# Patient Record
Sex: MALE | Race: WHITE | NOT HISPANIC OR LATINO | Employment: OTHER | URBAN - METROPOLITAN AREA
[De-identification: names, ages, dates, MRNs, and addresses within clinical notes are randomized per-mention and may not be internally consistent; named-entity substitution may affect disease eponyms.]

---

## 2017-01-12 RX ORDER — INSULIN GLARGINE 100 [IU]/ML
INJECTION, SOLUTION SUBCUTANEOUS
Qty: 15 ML | Refills: 6 | Status: SHIPPED | OUTPATIENT
Start: 2017-01-12 | End: 2018-01-25 | Stop reason: SDUPTHER

## 2017-01-12 RX ORDER — INSULIN ASPART 100 [IU]/ML
INJECTION, SOLUTION INTRAVENOUS; SUBCUTANEOUS
Qty: 15 ML | Refills: 6 | Status: SHIPPED | OUTPATIENT
Start: 2017-01-12 | End: 2017-08-15

## 2017-02-02 RX ORDER — BLOOD SUGAR DIAGNOSTIC
STRIP MISCELLANEOUS
Qty: 150 STRIP | Refills: 0 | Status: SHIPPED | OUTPATIENT
Start: 2017-02-02 | End: 2018-01-25 | Stop reason: SDUPTHER

## 2017-02-02 RX ORDER — CALCIUM CITRATE/VITAMIN D3 200MG-6.25
TABLET ORAL
Qty: 150 STRIP | Refills: 11 | Status: SHIPPED | OUTPATIENT
Start: 2017-02-02 | End: 2018-09-17

## 2017-02-07 DIAGNOSIS — E11.9 TYPE 2 DIABETES MELLITUS WITHOUT COMPLICATION: ICD-10-CM

## 2017-02-20 RX ORDER — OMEPRAZOLE 20 MG/1
CAPSULE, DELAYED RELEASE ORAL
Qty: 30 CAPSULE | Refills: 6 | Status: SHIPPED | OUTPATIENT
Start: 2017-02-20 | End: 2017-09-06 | Stop reason: SDUPTHER

## 2017-02-20 RX ORDER — ROSUVASTATIN CALCIUM 10 MG/1
TABLET, COATED ORAL
Qty: 30 TABLET | Refills: 6 | Status: SHIPPED | OUTPATIENT
Start: 2017-02-20 | End: 2017-09-06 | Stop reason: SDUPTHER

## 2017-02-20 RX ORDER — FLUOXETINE HYDROCHLORIDE 20 MG/1
CAPSULE ORAL
Qty: 30 CAPSULE | Refills: 6 | Status: SHIPPED | OUTPATIENT
Start: 2017-02-20 | End: 2017-09-06 | Stop reason: SDUPTHER

## 2017-02-20 RX ORDER — DIVALPROEX SODIUM 500 MG/1
TABLET, FILM COATED, EXTENDED RELEASE ORAL
Qty: 30 TABLET | Refills: 6 | Status: SHIPPED | OUTPATIENT
Start: 2017-02-20 | End: 2017-09-06 | Stop reason: SDUPTHER

## 2017-02-20 RX ORDER — ENALAPRIL MALEATE 20 MG/1
TABLET ORAL
Qty: 60 TABLET | Refills: 6 | Status: SHIPPED | OUTPATIENT
Start: 2017-02-20 | End: 2017-09-06 | Stop reason: SDUPTHER

## 2017-02-20 RX ORDER — BENZTROPINE MESYLATE 0.5 MG/1
TABLET ORAL
Qty: 60 TABLET | Refills: 6 | Status: SHIPPED | OUTPATIENT
Start: 2017-02-20 | End: 2017-09-06 | Stop reason: SDUPTHER

## 2017-05-18 ENCOUNTER — TELEPHONE (OUTPATIENT)
Dept: INTERNAL MEDICINE | Facility: CLINIC | Age: 67
End: 2017-05-18

## 2017-05-18 DIAGNOSIS — E55.9 VITAMIN D DEFICIENCY DISEASE: ICD-10-CM

## 2017-05-18 DIAGNOSIS — E53.8 VITAMIN B12 DEFICIENCY: ICD-10-CM

## 2017-05-18 DIAGNOSIS — Z12.5 SCREENING PSA (PROSTATE SPECIFIC ANTIGEN): ICD-10-CM

## 2017-05-18 DIAGNOSIS — E10.42 TYPE 1 DIABETES MELLITUS WITH DIABETIC POLYNEUROPATHY: Primary | ICD-10-CM

## 2017-05-19 NOTE — TELEPHONE ENCOUNTER
Zoya  He has dementia  Contact his caregiver, Costa Ledesma  He needs an apt to see me in August with labs and urine prior  Do his optometry exam the same day as one of the apts

## 2017-05-22 ENCOUNTER — PATIENT OUTREACH (OUTPATIENT)
Dept: ADMINISTRATIVE | Facility: HOSPITAL | Age: 67
End: 2017-05-22

## 2017-05-22 DIAGNOSIS — E10.8 TYPE 1 DIABETES MELLITUS WITH COMPLICATION: Primary | ICD-10-CM

## 2017-05-22 NOTE — PROGRESS NOTES
LPN, CCC contacted patient to assist with scheduling an Annual, Diabetes Check-Up, PCP OV, with labs prior. LPN, CCC spoke with patient's care giver, Costa Ledesma. Ms. Ledesma verified patient's name and date of birth. ADORE ODONNELL notified Ms. Ledesma of patient's past due HM screenings and Annual PCP OV. Ms. Ledesma verbalized understanding, and agreed to PCP appointment on 08/15/2017, Diabetic Eye Cam Screening post PCP appointment. And lab appointment on 08/08/2017. ADORE ODONNELL thanked Ms. Ledesma for her cooperation, and advised her to contact [facility] as needed.

## 2017-05-22 NOTE — TELEPHONE ENCOUNTER
"Labs prior, PCP OV, and Eyecam appointments scheduled. Ms. Leedsma wanted me to tell you, "Things are about the same, and we will be there in August."    Thanks,  Zoya."

## 2017-06-22 RX ORDER — DIVALPROEX SODIUM 250 MG/1
TABLET, FILM COATED, EXTENDED RELEASE ORAL
Qty: 30 TABLET | Refills: 11 | Status: SHIPPED | OUTPATIENT
Start: 2017-06-22 | End: 2018-06-28 | Stop reason: SDUPTHER

## 2017-06-22 RX ORDER — BLOOD-GLUCOSE METER
EACH MISCELLANEOUS
Qty: 200 EACH | Refills: 11 | Status: SHIPPED | OUTPATIENT
Start: 2017-06-22 | End: 2018-01-25 | Stop reason: SDUPTHER

## 2017-06-22 RX ORDER — MUPIROCIN 20 MG/G
OINTMENT TOPICAL
Qty: 22 G | Refills: 11 | Status: SHIPPED | OUTPATIENT
Start: 2017-06-22 | End: 2018-04-03 | Stop reason: SDUPTHER

## 2017-07-28 DIAGNOSIS — Z12.11 COLON CANCER SCREENING: ICD-10-CM

## 2017-08-08 ENCOUNTER — LAB VISIT (OUTPATIENT)
Dept: LAB | Facility: HOSPITAL | Age: 67
End: 2017-08-08
Attending: INTERNAL MEDICINE
Payer: COMMERCIAL

## 2017-08-08 DIAGNOSIS — Z12.5 SCREENING PSA (PROSTATE SPECIFIC ANTIGEN): ICD-10-CM

## 2017-08-08 DIAGNOSIS — E10.42 TYPE 1 DIABETES MELLITUS WITH DIABETIC POLYNEUROPATHY: ICD-10-CM

## 2017-08-08 DIAGNOSIS — E53.8 VITAMIN B12 DEFICIENCY: ICD-10-CM

## 2017-08-08 DIAGNOSIS — E55.9 VITAMIN D DEFICIENCY DISEASE: ICD-10-CM

## 2017-08-08 LAB
25(OH)D3+25(OH)D2 SERPL-MCNC: 76 NG/ML
ALBUMIN SERPL BCP-MCNC: 3.4 G/DL
ALP SERPL-CCNC: 84 U/L
ALT SERPL W/O P-5'-P-CCNC: 23 U/L
ANION GAP SERPL CALC-SCNC: 10 MMOL/L
AST SERPL-CCNC: 18 U/L
BASOPHILS # BLD AUTO: 0.03 K/UL
BASOPHILS NFR BLD: 0.6 %
BILIRUB SERPL-MCNC: 0.5 MG/DL
BUN SERPL-MCNC: 17 MG/DL
CALCIUM SERPL-MCNC: 9.1 MG/DL
CHLORIDE SERPL-SCNC: 100 MMOL/L
CHOLEST/HDLC SERPL: 2.5 {RATIO}
CO2 SERPL-SCNC: 25 MMOL/L
COMPLEXED PSA SERPL-MCNC: 0.54 NG/ML
CREAT SERPL-MCNC: 0.9 MG/DL
DIFFERENTIAL METHOD: ABNORMAL
EOSINOPHIL # BLD AUTO: 0.3 K/UL
EOSINOPHIL NFR BLD: 6.1 %
ERYTHROCYTE [DISTWIDTH] IN BLOOD BY AUTOMATED COUNT: 13.3 %
EST. GFR  (AFRICAN AMERICAN): >60 ML/MIN/1.73 M^2
EST. GFR  (NON AFRICAN AMERICAN): >60 ML/MIN/1.73 M^2
GLUCOSE SERPL-MCNC: 352 MG/DL
HCT VFR BLD AUTO: 38.3 %
HDL/CHOLESTEROL RATIO: 39.7 %
HDLC SERPL-MCNC: 141 MG/DL
HDLC SERPL-MCNC: 56 MG/DL
HGB BLD-MCNC: 13.1 G/DL
LDLC SERPL CALC-MCNC: 65.4 MG/DL
LYMPHOCYTES # BLD AUTO: 2.1 K/UL
LYMPHOCYTES NFR BLD: 40 %
MCH RBC QN AUTO: 30 PG
MCHC RBC AUTO-ENTMCNC: 34.2 G/DL
MCV RBC AUTO: 88 FL
MONOCYTES # BLD AUTO: 0.6 K/UL
MONOCYTES NFR BLD: 10.5 %
NEUTROPHILS # BLD AUTO: 2.2 K/UL
NEUTROPHILS NFR BLD: 42.6 %
NONHDLC SERPL-MCNC: 85 MG/DL
PLATELET # BLD AUTO: 250 K/UL
PMV BLD AUTO: 11.9 FL
POTASSIUM SERPL-SCNC: 4.9 MMOL/L
PROT SERPL-MCNC: 7 G/DL
RBC # BLD AUTO: 4.36 M/UL
SODIUM SERPL-SCNC: 135 MMOL/L
TRIGL SERPL-MCNC: 98 MG/DL
TSH SERPL DL<=0.005 MIU/L-ACNC: 2.13 UIU/ML
VIT B12 SERPL-MCNC: >2000 PG/ML
WBC # BLD AUTO: 5.22 K/UL

## 2017-08-08 PROCEDURE — 80061 LIPID PANEL: CPT

## 2017-08-08 PROCEDURE — 84443 ASSAY THYROID STIM HORMONE: CPT

## 2017-08-08 PROCEDURE — 84153 ASSAY OF PSA TOTAL: CPT

## 2017-08-08 PROCEDURE — 83036 HEMOGLOBIN GLYCOSYLATED A1C: CPT

## 2017-08-08 PROCEDURE — 36415 COLL VENOUS BLD VENIPUNCTURE: CPT | Mod: PO

## 2017-08-08 PROCEDURE — 85025 COMPLETE CBC W/AUTO DIFF WBC: CPT

## 2017-08-08 PROCEDURE — 82607 VITAMIN B-12: CPT

## 2017-08-08 PROCEDURE — 82306 VITAMIN D 25 HYDROXY: CPT

## 2017-08-08 PROCEDURE — 80053 COMPREHEN METABOLIC PANEL: CPT

## 2017-08-09 LAB
ESTIMATED AVG GLUCOSE: 223 MG/DL
HBA1C MFR BLD HPLC: 9.4 %

## 2017-08-11 ENCOUNTER — TELEPHONE (OUTPATIENT)
Dept: OPTOMETRY | Facility: CLINIC | Age: 67
End: 2017-08-11

## 2017-08-15 ENCOUNTER — OFFICE VISIT (OUTPATIENT)
Dept: INTERNAL MEDICINE | Facility: CLINIC | Age: 67
End: 2017-08-15
Payer: COMMERCIAL

## 2017-08-15 ENCOUNTER — TELEPHONE (OUTPATIENT)
Dept: INTERNAL MEDICINE | Facility: CLINIC | Age: 67
End: 2017-08-15

## 2017-08-15 VITALS
HEART RATE: 60 BPM | HEIGHT: 72 IN | WEIGHT: 174 LBS | SYSTOLIC BLOOD PRESSURE: 110 MMHG | DIASTOLIC BLOOD PRESSURE: 60 MMHG | OXYGEN SATURATION: 98 % | BODY MASS INDEX: 23.57 KG/M2

## 2017-08-15 DIAGNOSIS — Z00.00 ROUTINE GENERAL MEDICAL EXAMINATION AT A HEALTH CARE FACILITY: Primary | ICD-10-CM

## 2017-08-15 PROCEDURE — 99999 PR PBB SHADOW E&M-EST. PATIENT-LVL II: CPT | Mod: PBBFAC,,, | Performed by: INTERNAL MEDICINE

## 2017-08-15 PROCEDURE — 99396 PREV VISIT EST AGE 40-64: CPT | Mod: S$GLB,,, | Performed by: INTERNAL MEDICINE

## 2017-08-15 NOTE — TELEPHONE ENCOUNTER
----- Message from Jayson Martines sent at 8/15/2017 11:12 AM CDT -----  Contact: Costa Friend [phone]  Is calling ahead of the above pt's apt with you today, don't want to say this in front of him, But his Memory is getting shorter and shorter, a little Paranoid, more anger, Delusional memory loss, hopefully you can prescribe a medication to help him, advice    Thanks

## 2017-08-15 NOTE — PROGRESS NOTES
CHIEF COMPLAINT: Annual exam    HISTORY OF PRESENT ILLNESS: 66-year-old man presents for an annual exam. He has been doing well. He lives with his Costa Ledesma ([phone]), who helps him with his medications and meals. Most of the history is obtained from Costa due to his dementia. He is happy living with Costa. He is eating well. He is currently taking Lantus 15 units in the evening and Novolog 3-5 units plus sliding scale before meals. Blood sugars have been running 100-300. No fever, chills, nausea, voimiting, constipation, diarrhea. No significant hypoglycemia     No dysuria, hematuria, nocturia, straining or incomplete void, chest pain, shortness of breath, rhinorrhea, headache, blurred vision, hearing loss. He continues to take Crestor 10 mg daily for his hyperlipidemia. He denies any joint pain, muscle pain. He continues to take enalapril 20 mg twice daily and Norvasc 10 mg daily for his hypertension. He is on omeprazole 20 mg once daily for reflux and has not had any reflux symptoms.     HE is currently taking prozac 20 mg daily, depakote 250 mg in am and 500 mg in the evening and cogentin 0.5 mg twice daily for his mood and seizure disorder. He has not had any seizures. Mood is good. HE gets irritable at times and is redirectable.  His memory has generally declined.  He is not paranoid.  He sleeps well.  No depression.      He has seen neurology for his movement disorder. They feel that he could have Tardative Dyskinesia. He was taking amatandine twice daily and mirapex. His movement improved but he got very angry so Costa stopped the medication. His excessive movement does not bother him.      HE is sleeping well with Melatonin 10 mg at bedtime and occasional benadryl one tablet at bedtime.     He quit smoking over 4 years ago - cough is improved. NO gagging or difficulty eating. He has not been ill in the last year.     PAST MEDICAL HISTORY:   1. Vascular dementia.   2. Type I diabetes.   3. Seizure  disorder.   4. History of polysubstance abuse.   5. Recovered alcoholic   6. Bipolar disorder.   7. History of renal insufficiency.   8. Hepatitis C.   9. History of left a left upper extremity DVT January 2007 after an IV.   10. First cervical spine arthritis on C6-C7 on x-ray.   11. Hospitalization July 2008 due to ingestion of caustic substance, which caused erosive esophagitis.    12. History of her aspiration during hospitalization 7/2008.     MEDICATIONS and ALLERGIES: Updated on epic.     PHYSICAL EXAMINATION:   /60   Pulse 60   Ht 6' (1.829 m)   Wt 78.9 kg (174 lb)   SpO2 98%   BMI 23.60 kg/m²     General: Alert, oriented. No apparent distress. Affect within normal limits.   Conjunctivae anicteric. Tympanic membranes clear. Oropharynx clear. Poor dentition  Neck supple.   Respiratory effort normal. Lungs clear bilaterally. Good air flow  Heart: Regular rate and rhythm without murmurs, gallops or rubs.   No lower extremity edema.   Abdomen: Soft, nondistended, nontender. Bowel sounds present. No   hepatosplenomegaly.    Constant movement of arms, legs, lips and tongue. He could not stay still. He did not look anxious.    Protective Sensation (w/ 10 gram monofilament):  Right: Decreased  Left: Decreased    Visual Inspection:  Normal -  Bilateral    Pedal Pulses:   Right: Present  Left: Present    Posterior tibialis:   Right:Present  Left: Present        ASSESSMENT AND PLAN:    Annual exam  1. Type I diabetes - stable.  2. Hypertension - controlled.   3. Hyperlipidemia - controlled  4. Hepatitis C - stable  5. Bipolar disorder - mood stable    7. COPD - has quit  9. Screening - . Pt declines colonscopy. PSA 8/17  10. Movement disorder - stable. Declines neurology exam  I'll see him back in 3 months, sooner if problems arise  Caregiver does not want him to come in 3 months but 6 months  Pneumovax and flu 2015

## 2017-09-06 RX ORDER — ENALAPRIL MALEATE 20 MG/1
TABLET ORAL
Qty: 60 TABLET | Refills: 11 | Status: SHIPPED | OUTPATIENT
Start: 2017-09-06 | End: 2018-09-17

## 2017-09-06 RX ORDER — FLUOXETINE HYDROCHLORIDE 20 MG/1
CAPSULE ORAL
Qty: 30 CAPSULE | Refills: 11 | Status: SHIPPED | OUTPATIENT
Start: 2017-09-06 | End: 2018-09-17

## 2017-09-06 RX ORDER — ROSUVASTATIN CALCIUM 10 MG/1
TABLET, COATED ORAL
Qty: 30 TABLET | Refills: 11 | Status: SHIPPED | OUTPATIENT
Start: 2017-09-06 | End: 2018-09-17

## 2017-09-06 RX ORDER — BENZTROPINE MESYLATE 0.5 MG/1
TABLET ORAL
Qty: 60 TABLET | Refills: 11 | Status: SHIPPED | OUTPATIENT
Start: 2017-09-06 | End: 2018-09-17

## 2017-09-06 RX ORDER — DIVALPROEX SODIUM 500 MG/1
TABLET, FILM COATED, EXTENDED RELEASE ORAL
Qty: 30 TABLET | Refills: 11 | Status: SHIPPED | OUTPATIENT
Start: 2017-09-06 | End: 2018-10-01 | Stop reason: SDUPTHER

## 2017-09-06 RX ORDER — OMEPRAZOLE 20 MG/1
CAPSULE, DELAYED RELEASE ORAL
Qty: 30 CAPSULE | Refills: 11 | Status: SHIPPED | OUTPATIENT
Start: 2017-09-06 | End: 2018-09-17

## 2017-12-04 ENCOUNTER — TELEPHONE (OUTPATIENT)
Dept: INTERNAL MEDICINE | Facility: CLINIC | Age: 67
End: 2017-12-04

## 2017-12-04 NOTE — TELEPHONE ENCOUNTER
----- Message from Tere Roland sent at 12/4/2017  1:45 PM CST -----  Contact: Costa/claudine/[phone]  Requesting test result be mailed to [address] [place] [address]. Please advise.

## 2017-12-05 NOTE — TELEPHONE ENCOUNTER
----- Message from Melissa Moore sent at 12/4/2017  4:31 PM CST -----  Contact: pt [phone]  Pt is returning call back regarding test results

## 2018-01-02 RX ORDER — AMLODIPINE BESYLATE 10 MG/1
TABLET ORAL
Qty: 90 TABLET | Refills: 5 | Status: SHIPPED | OUTPATIENT
Start: 2018-01-02 | End: 2018-09-17

## 2018-01-25 RX ORDER — INSULIN ASPART 100 [IU]/ML
INJECTION, SOLUTION INTRAVENOUS; SUBCUTANEOUS
Qty: 15 ML | Refills: 6 | Status: SHIPPED | OUTPATIENT
Start: 2018-01-25 | End: 2018-09-17 | Stop reason: SDUPTHER

## 2018-01-25 RX ORDER — BLOOD SUGAR DIAGNOSTIC
STRIP MISCELLANEOUS
Qty: 150 STRIP | Refills: 11 | Status: SHIPPED | OUTPATIENT
Start: 2018-01-25 | End: 2018-09-17

## 2018-01-25 RX ORDER — INSULIN GLARGINE 100 [IU]/ML
INJECTION, SOLUTION SUBCUTANEOUS
Qty: 15 ML | Refills: 6 | Status: SHIPPED | OUTPATIENT
Start: 2018-01-25 | End: 2018-09-17

## 2018-01-25 NOTE — TELEPHONE ENCOUNTER
Pt is running out of needles by the end of the month. Costa would like extra insulin and needles quantity called in. Costa would also like to have a Rx of tamiflu on hand just in case she thinks he needs it. Pt has no symptoms

## 2018-01-25 NOTE — TELEPHONE ENCOUNTER
----- Message from Lucio Sauceda sent at 1/25/2018  9:17 AM CST -----  Contact: Friend/Costa [phone]  Although, the patient doesn't have any flu symptoms she would like to know if you will call in a prescription for Tamiflu.    Pharmacy: [facility]  [place] - [phone] (phone) [phone] (Fax)    Thank you    Refill requests:    Medical Advice also: She requested that you change his insulin glargine (LANTUS SOLOSTAR) 100 unit/mL (3 mL) InPn pen and the LANTUS SOLOSTAR 100 unit/mL (3 mL) InPn pen because she has been having to give him more than prescribed whenever his sugar is higher.    She also would like you to order 2 boxes of ACCU-CHEK SAFE-T-PRO 23 gauge Misc instead of one.        Thank you

## 2018-03-06 DIAGNOSIS — E11.9 TYPE 2 DIABETES MELLITUS WITHOUT COMPLICATION: ICD-10-CM

## 2018-03-28 ENCOUNTER — TELEPHONE (OUTPATIENT)
Dept: INTERNAL MEDICINE | Facility: CLINIC | Age: 68
End: 2018-03-28

## 2018-03-28 DIAGNOSIS — F03.90 DEMENTIA WITHOUT BEHAVIORAL DISTURBANCE, UNSPECIFIED DEMENTIA TYPE: Primary | ICD-10-CM

## 2018-03-28 NOTE — TELEPHONE ENCOUNTER
----- Message from Vicky España sent at 3/28/2018 10:48 AM CDT -----  Contact: Friend/Costa [phone]  Requesting a Referral    Requesting to see: Neurologist    Reason for request: Brain Scan/Mood Swings/Memory loss    Specific physician requested: No    Please call patient to schedule appt when referral has been placed.    Thank You

## 2018-04-03 RX ORDER — MUPIROCIN 20 MG/G
OINTMENT TOPICAL
Qty: 22 G | Refills: 4 | Status: SHIPPED | OUTPATIENT
Start: 2018-04-03 | End: 2018-09-17

## 2018-04-09 ENCOUNTER — TELEPHONE (OUTPATIENT)
Dept: NEUROLOGY | Facility: CLINIC | Age: 68
End: 2018-04-09

## 2018-04-09 NOTE — TELEPHONE ENCOUNTER
----- Message from Tamera Keys sent at 4/9/2018  9:21 AM CDT -----  Contact: Costa Ledesma- pt roommate  Pt's roommate Costa Ledesma called to schedule an appt for pt due to dementia. Pt does have a referral in the system from Dr. Gemma Boone. Pt roommate would like for the nurse to give her a call back to get pt scheduled some time at the end of May, or in June, on a Monday preferably, if at all possible (appt books not showing any availability) Please advise.    Pt roommate can be reached at [phone]

## 2018-05-21 ENCOUNTER — OFFICE VISIT (OUTPATIENT)
Dept: NEUROLOGY | Facility: CLINIC | Age: 68
End: 2018-05-21
Payer: COMMERCIAL

## 2018-05-21 VITALS — WEIGHT: 152 LBS | HEIGHT: 72 IN | BODY MASS INDEX: 20.59 KG/M2

## 2018-05-21 DIAGNOSIS — G25.5 CHOREA: ICD-10-CM

## 2018-05-21 DIAGNOSIS — R13.10 DYSPHAGIA, UNSPECIFIED TYPE: Primary | ICD-10-CM

## 2018-05-21 PROCEDURE — 3008F BODY MASS INDEX DOCD: CPT | Mod: CPTII,S$GLB,, | Performed by: NURSE PRACTITIONER

## 2018-05-21 PROCEDURE — 99999 PR PBB SHADOW E&M-EST. PATIENT-LVL III: CPT | Mod: PBBFAC,,, | Performed by: NURSE PRACTITIONER

## 2018-05-21 PROCEDURE — 99204 OFFICE O/P NEW MOD 45 MIN: CPT | Mod: S$GLB,,, | Performed by: NURSE PRACTITIONER

## 2018-05-21 RX ORDER — OLANZAPINE 2.5 MG/1
2.5 TABLET ORAL 2 TIMES DAILY
Qty: 60 TABLET | Refills: 11 | Status: SHIPPED | OUTPATIENT
Start: 2018-05-21 | End: 2018-09-17

## 2018-05-21 NOTE — PATIENT INSTRUCTIONS
I have ordered swallow study. This will ensure you are swallowing safely.    I have also ordered olanzapine 2.5 mg twice daily. Please give in the morning and evening.     Does anyone in the family have movements like you?   Has anyone in the family been diagnosed with [place]'s disease?
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
## 2018-06-11 ENCOUNTER — TELEPHONE (OUTPATIENT)
Dept: NEUROLOGY | Facility: CLINIC | Age: 68
End: 2018-06-11

## 2018-06-11 NOTE — TELEPHONE ENCOUNTER
----- Message from Ale Adams NP sent at 5/21/2018  6:31 PM CDT -----  Needs appt with HD at risk clinic

## 2018-06-11 NOTE — TELEPHONE ENCOUNTER
Returned call to pt's wife and she states the medication did not work and she stopped it as patient was seeing things. She also states she talk to his family and no one knows about this disease and she knows the patient does not have this disease and does not want to scheduled any appointments. She states if we need further information about the patient to contact PCP.

## 2018-06-19 ENCOUNTER — TELEPHONE (OUTPATIENT)
Dept: INTERNAL MEDICINE | Facility: CLINIC | Age: 68
End: 2018-06-19

## 2018-06-19 DIAGNOSIS — E55.9 VITAMIN D DEFICIENCY DISEASE: ICD-10-CM

## 2018-06-19 DIAGNOSIS — E53.8 VITAMIN B12 DEFICIENCY: ICD-10-CM

## 2018-06-19 DIAGNOSIS — E10.9 TYPE 1 DIABETES MELLITUS WITHOUT COMPLICATION: Primary | ICD-10-CM

## 2018-06-19 NOTE — TELEPHONE ENCOUNTER
----- Message from Ruth Magallon sent at 6/18/2018 11:08 AM CDT -----  Contact: room mate/jake/[phone]      ----- Message -----  From: Gemma Dugan  Sent: 6/18/2018  10:30 AM  To: Paolo ZAPATA Staff    Pt jackelin pichardo called in regards to talking to the dr about giving the pt holy basel (gaya) vitamins and  ashwagandha vitamins and it is calming the pt down. The med's come from [facility]. She would like to talk to the dr about the pt. The pharmacy will sent the office about the vitamins. She would also like to get the pt epp lab orders put into the system. She would like his appointment on 9-10-18 10:[facility].      Please advise

## 2018-06-19 NOTE — TELEPHONE ENCOUNTER
Spoke with Costa, she states that the vitamins are really helping him and just wanted to notify the PCP. Requesting physical lab orders. Would like labs schedule 9/10/18 at 10 [facility].      Please see message below.

## 2018-06-25 ENCOUNTER — TELEPHONE (OUTPATIENT)
Dept: INTERNAL MEDICINE | Facility: CLINIC | Age: 68
End: 2018-06-25

## 2018-06-25 NOTE — TELEPHONE ENCOUNTER
----- Message from Lucio Sauceda sent at 6/25/2018 11:17 AM CDT -----  Contact: Roommate/Costa [phone]  Patient is returning a phone call.  Who left a message for the patient: Dr. Boone  Does patient know what this is regarding:  She wants to know if he should be taking the Holy Rosemary Vitamin that she has been giving him. She said the pharmacist said it's ok, and it is working to help him be calm.    She also said she doesn't know what the fit kit is.    Thank you

## 2018-06-28 RX ORDER — BLOOD-GLUCOSE METER
EACH MISCELLANEOUS
Qty: 500 EACH | Refills: 4 | Status: SHIPPED | OUTPATIENT
Start: 2018-06-28 | End: 2018-09-17

## 2018-06-28 RX ORDER — DIVALPROEX SODIUM 250 MG/1
TABLET, FILM COATED, EXTENDED RELEASE ORAL
Qty: 30 TABLET | Refills: 11 | Status: SHIPPED | OUTPATIENT
Start: 2018-06-28 | End: 2018-09-17

## 2018-08-03 DIAGNOSIS — Z12.11 COLON CANCER SCREENING: ICD-10-CM

## 2018-09-10 ENCOUNTER — LAB VISIT (OUTPATIENT)
Dept: LAB | Facility: HOSPITAL | Age: 68
End: 2018-09-10
Attending: INTERNAL MEDICINE
Payer: COMMERCIAL

## 2018-09-10 DIAGNOSIS — E10.9 TYPE 1 DIABETES MELLITUS WITHOUT COMPLICATION: ICD-10-CM

## 2018-09-10 DIAGNOSIS — E55.9 VITAMIN D DEFICIENCY DISEASE: ICD-10-CM

## 2018-09-10 DIAGNOSIS — E53.8 VITAMIN B12 DEFICIENCY: ICD-10-CM

## 2018-09-10 LAB
25(OH)D3+25(OH)D2 SERPL-MCNC: 48 NG/ML
ALBUMIN SERPL BCP-MCNC: 3.6 G/DL
ALP SERPL-CCNC: 99 U/L
ALT SERPL W/O P-5'-P-CCNC: 25 U/L
ANION GAP SERPL CALC-SCNC: 9 MMOL/L
AST SERPL-CCNC: 18 U/L
BASOPHILS # BLD AUTO: 0.02 K/UL
BASOPHILS NFR BLD: 0.4 %
BILIRUB SERPL-MCNC: 0.4 MG/DL
BUN SERPL-MCNC: 17 MG/DL
CALCIUM SERPL-MCNC: 9.2 MG/DL
CHLORIDE SERPL-SCNC: 101 MMOL/L
CHOLEST SERPL-MCNC: 158 MG/DL
CHOLEST/HDLC SERPL: 2.6 {RATIO}
CO2 SERPL-SCNC: 28 MMOL/L
CREAT SERPL-MCNC: 0.8 MG/DL
DIFFERENTIAL METHOD: ABNORMAL
EOSINOPHIL # BLD AUTO: 0.3 K/UL
EOSINOPHIL NFR BLD: 5 %
ERYTHROCYTE [DISTWIDTH] IN BLOOD BY AUTOMATED COUNT: 13.4 %
EST. GFR  (AFRICAN AMERICAN): >60 ML/MIN/1.73 M^2
EST. GFR  (NON AFRICAN AMERICAN): >60 ML/MIN/1.73 M^2
ESTIMATED AVG GLUCOSE: 240 MG/DL
GLUCOSE SERPL-MCNC: 431 MG/DL
HBA1C MFR BLD HPLC: 10 %
HCT VFR BLD AUTO: 38 %
HDLC SERPL-MCNC: 61 MG/DL
HDLC SERPL: 38.6 %
HGB BLD-MCNC: 12.5 G/DL
IMM GRANULOCYTES # BLD AUTO: 0.02 K/UL
IMM GRANULOCYTES NFR BLD AUTO: 0.4 %
LDLC SERPL CALC-MCNC: 84 MG/DL
LYMPHOCYTES # BLD AUTO: 1.7 K/UL
LYMPHOCYTES NFR BLD: 30.7 %
MCH RBC QN AUTO: 30.1 PG
MCHC RBC AUTO-ENTMCNC: 32.9 G/DL
MCV RBC AUTO: 92 FL
MONOCYTES # BLD AUTO: 0.6 K/UL
MONOCYTES NFR BLD: 9.9 %
NEUTROPHILS # BLD AUTO: 3 K/UL
NEUTROPHILS NFR BLD: 53.6 %
NONHDLC SERPL-MCNC: 97 MG/DL
NRBC BLD-RTO: 0 /100 WBC
PLATELET # BLD AUTO: 201 K/UL
PMV BLD AUTO: 12.2 FL
POTASSIUM SERPL-SCNC: 4.6 MMOL/L
PROT SERPL-MCNC: 7.1 G/DL
RBC # BLD AUTO: 4.15 M/UL
SODIUM SERPL-SCNC: 138 MMOL/L
TRIGL SERPL-MCNC: 65 MG/DL
TSH SERPL DL<=0.005 MIU/L-ACNC: 2.4 UIU/ML
VIT B12 SERPL-MCNC: 1786 PG/ML
WBC # BLD AUTO: 5.57 K/UL

## 2018-09-10 PROCEDURE — 82306 VITAMIN D 25 HYDROXY: CPT

## 2018-09-10 PROCEDURE — 82607 VITAMIN B-12: CPT

## 2018-09-10 PROCEDURE — 80061 LIPID PANEL: CPT

## 2018-09-10 PROCEDURE — 83036 HEMOGLOBIN GLYCOSYLATED A1C: CPT

## 2018-09-10 PROCEDURE — 85025 COMPLETE CBC W/AUTO DIFF WBC: CPT

## 2018-09-10 PROCEDURE — 80053 COMPREHEN METABOLIC PANEL: CPT

## 2018-09-10 PROCEDURE — 36415 COLL VENOUS BLD VENIPUNCTURE: CPT | Mod: PO

## 2018-09-10 PROCEDURE — 84443 ASSAY THYROID STIM HORMONE: CPT

## 2018-09-17 ENCOUNTER — OFFICE VISIT (OUTPATIENT)
Dept: INTERNAL MEDICINE | Facility: CLINIC | Age: 68
End: 2018-09-17
Payer: COMMERCIAL

## 2018-09-17 VITALS
BODY MASS INDEX: 22.31 KG/M2 | SYSTOLIC BLOOD PRESSURE: 117 MMHG | OXYGEN SATURATION: 97 % | DIASTOLIC BLOOD PRESSURE: 70 MMHG | WEIGHT: 173.88 LBS | HEIGHT: 74 IN | HEART RATE: 102 BPM

## 2018-09-17 DIAGNOSIS — E10.8 TYPE 1 DIABETES MELLITUS WITH COMPLICATION: ICD-10-CM

## 2018-09-17 DIAGNOSIS — Z00.00 ROUTINE GENERAL MEDICAL EXAMINATION AT A HEALTH CARE FACILITY: Primary | ICD-10-CM

## 2018-09-17 DIAGNOSIS — B19.20 HEPATITIS C VIRUS INFECTION WITHOUT HEPATIC COMA, UNSPECIFIED CHRONICITY: ICD-10-CM

## 2018-09-17 PROCEDURE — 99397 PER PM REEVAL EST PAT 65+ YR: CPT | Mod: S$GLB,,, | Performed by: INTERNAL MEDICINE

## 2018-09-17 PROCEDURE — 3078F DIAST BP <80 MM HG: CPT | Mod: CPTII,S$GLB,, | Performed by: INTERNAL MEDICINE

## 2018-09-17 PROCEDURE — 3046F HEMOGLOBIN A1C LEVEL >9.0%: CPT | Mod: CPTII,S$GLB,, | Performed by: INTERNAL MEDICINE

## 2018-09-17 PROCEDURE — 99999 PR PBB SHADOW E&M-EST. PATIENT-LVL III: CPT | Mod: PBBFAC,,, | Performed by: INTERNAL MEDICINE

## 2018-09-17 PROCEDURE — 3074F SYST BP LT 130 MM HG: CPT | Mod: CPTII,S$GLB,, | Performed by: INTERNAL MEDICINE

## 2018-09-17 RX ORDER — INSULIN GLARGINE 100 [IU]/ML
20 INJECTION, SOLUTION SUBCUTANEOUS NIGHTLY
Qty: 15 ML | Refills: 6 | Status: SHIPPED | OUTPATIENT
Start: 2018-09-17 | End: 2019-10-21 | Stop reason: SDUPTHER

## 2018-09-17 RX ORDER — ENALAPRIL MALEATE 20 MG/1
20 TABLET ORAL 2 TIMES DAILY
Qty: 60 TABLET | Refills: 11 | Status: SHIPPED | OUTPATIENT
Start: 2018-09-17 | End: 2019-09-25 | Stop reason: SDUPTHER

## 2018-09-17 RX ORDER — INSULIN ASPART 100 [IU]/ML
INJECTION, SOLUTION INTRAVENOUS; SUBCUTANEOUS
Qty: 15 ML | Refills: 6 | Status: SHIPPED | OUTPATIENT
Start: 2018-09-17 | End: 2019-03-14 | Stop reason: SDUPTHER

## 2018-09-17 RX ORDER — ROSUVASTATIN CALCIUM 10 MG/1
10 TABLET, COATED ORAL NIGHTLY
Qty: 30 TABLET | Refills: 11 | Status: SHIPPED | OUTPATIENT
Start: 2018-09-17 | End: 2019-10-21 | Stop reason: SDUPTHER

## 2018-09-17 RX ORDER — AMLODIPINE BESYLATE 10 MG/1
10 TABLET ORAL DAILY
Qty: 90 TABLET | Refills: 5 | Status: SHIPPED | OUTPATIENT
Start: 2018-09-17 | End: 2019-09-23 | Stop reason: SDUPTHER

## 2018-09-17 RX ORDER — KETOCONAZOLE 20 MG/ML
SHAMPOO, SUSPENSION TOPICAL
Qty: 240 ML | Refills: 3 | Status: SHIPPED | OUTPATIENT
Start: 2018-09-17 | End: 2019-10-21 | Stop reason: SDUPTHER

## 2018-09-17 RX ORDER — MUPIROCIN 20 MG/G
OINTMENT TOPICAL
Qty: 90 G | Refills: 3 | Status: SHIPPED | OUTPATIENT
Start: 2018-09-17 | End: 2019-08-09 | Stop reason: SDUPTHER

## 2018-09-17 RX ORDER — FLUOXETINE HYDROCHLORIDE 20 MG/1
20 CAPSULE ORAL DAILY
Qty: 30 CAPSULE | Refills: 11 | Status: SHIPPED | OUTPATIENT
Start: 2018-09-17 | End: 2019-09-23 | Stop reason: SDUPTHER

## 2018-09-17 RX ORDER — BENZTROPINE MESYLATE 0.5 MG/1
0.5 TABLET ORAL 2 TIMES DAILY
Qty: 60 TABLET | Refills: 11 | Status: SHIPPED | OUTPATIENT
Start: 2018-09-17 | End: 2019-10-21 | Stop reason: SDUPTHER

## 2018-09-17 RX ORDER — OMEPRAZOLE 20 MG/1
20 CAPSULE, DELAYED RELEASE ORAL DAILY
Qty: 30 CAPSULE | Refills: 11 | Status: SHIPPED | OUTPATIENT
Start: 2018-09-17 | End: 2019-09-23 | Stop reason: SDUPTHER

## 2018-09-17 RX ORDER — DIVALPROEX SODIUM 250 MG/1
250 TABLET, FILM COATED, EXTENDED RELEASE ORAL DAILY
Qty: 30 TABLET | Refills: 11 | Status: SHIPPED | OUTPATIENT
Start: 2018-09-17 | End: 2019-09-23 | Stop reason: SDUPTHER

## 2018-09-17 NOTE — PROGRESS NOTES
CHIEF COMPLAINT: Annual exam    HISTORY OF PRESENT ILLNESS: 66-year-old man presents for an annual exam.     He has been doing well. He lives with his Costa Ledesma ([phone]), who helps him with his medications and meals. Most of the history is obtained from Costa due to his dementia. He is happy living with Costa. He is eating well.     HE is now taking ashwagandha (orgainic ashwagandha 380 mg plus ashwagandha extract 95 mg) one capsule twice daily and Holy Basil Leaf (holy basil leaf extract 292 mg and holy basil leaf supercritical extract 190 mg) one capsule twice daily.  HE is also taking Melatonin 10 mg three times daily which helps calm him. He is sleeping well. He has less movement of arms, legs, tongue.  HE can sit still in a chair.  Memory is better. HE is more aware.  Mood is better. HE is calmer.  Disposition is better. He rarely gets irritable. Anxiety is better. Swallowing is better. He did not have a swallowing better.  NO chocking.  He no longer worries about things.  He can hold a conversation. HE now has a sense of humor.  He saw neurology 5/21/18 who recommened Zyprexa 2.5 mg twice daily which made things worse. HE was hearing voices so Costa stopped this medication.     He is currently taking Lantus 15 units in the evening and Novolog 3-5 units plus sliding scale before meals. Blood sugars have been higher.  Costa gives him extra Lanuts twice a week 8-10 units extra.. No fever, chills, nausea, voimiting, constipation, diarrhea. No significant hypoglycemia     No dysuria, hematuria, nocturia, straining or incomplete void, chest pain, shortness of breath, rhinorrhea, headache, blurred vision, hearing loss. He continues to take Crestor 10 mg daily for his hyperlipidemia. He denies any joint pain, muscle pain. He continues to take enalapril 20 mg twice daily and Norvasc 10 mg daily for his hypertension. He is on omeprazole 20 mg once daily for reflux and has not had any reflux symptoms.     HE is  currently taking prozac 20 mg daily, depakote 250 mg in am and 500 mg in the evening and cogentin 0.5 mg twice daily for his mood and seizure disorder. He has not had any seizures. Mood is good. HE gets irritable at times and is redirectable.  His memory has generally declined.  He is not paranoid.  He sleeps well.  No depression.        REVIEW OF SYSTEMS: She denies fevers, chills, night sweats, fatigue, visual change, hearing loss, sinus congestion, sore throat, chest pain, shortness of breath, nausea, vomiting, constipation, diarrhea, dysuria, hematuria, polydipsia, polyuria, joint pain, muscle pain, headaches, anxiety, depression, insomnia.     PAST MEDICAL HISTORY:   1. Vascular dementia.   2. Type I diabetes.   3. Seizure disorder.   4. History of polysubstance abuse.   5. Recovered alcoholic   6. Bipolar disorder.   7. History of renal insufficiency.   8. Hepatitis C.   9. History of left a left upper extremity DVT January 2007 after an IV.   10. First cervical spine arthritis on C6-C7 on x-ray.   11. Hospitalization July 2008 due to ingestion of caustic substance, which caused erosive esophagitis.    12. History of her aspiration during hospitalization 7/2008.     MEDICATIONS and ALLERGIES: Updated on epic.     PHYSICAL EXAMINATION:   /70 (BP Location: Right arm, Patient Position: Sitting, BP Method: Large (Manual))   Pulse 102   SpO2 97%     General: Alert, oriented. No apparent distress. Affect within normal limits.   Conjunctivae anicteric. Tympanic membranes clear. Oropharynx clear. Poor dentition  Neck supple.   Respiratory effort normal. Lungs clear bilaterally. Good air flow  Heart: Regular rate and rhythm without murmurs, gallops or rubs.   No lower extremity edema.   Abdomen: Soft, nondistended, nontender. Bowel sounds present. No   hepatosplenomegaly.    Constant movement of arms, legs, lips and tongue - not as bad. He could stay in the chair     Dandruff on scalp     ASSESSMENT AND PLAN:     Annual exam  1. Type I diabetes - watch diet. Increase Lantus to 17 units every night, if sugars still running high 20 units nightly. To use sliding scale insulin - declines dietician. Free style abdi sensor  2. Hypertension - controlled.   3. Hyperlipidemia - controlled  4. Hepatitis C -refer to hepatology for treatment.   5. Bipolar disorder - mood better    7. COPD - has quit  9. Screening - . Pt declines colonscopy. PSA 8/17  10. Movement disorder - better with supplements  11. Seborrehic dermatitis - ketoconazole shampoo  Cannot do colonoscopy prep  I'll see him back in 3 months, sooner if problems arise  Caregiver does not want him to come in 3 months but 6 months  Pneumovax 2015   and flu

## 2018-09-23 ENCOUNTER — DOCUMENTATION ONLY (OUTPATIENT)
Dept: TRANSPLANT | Facility: CLINIC | Age: 68
End: 2018-09-23

## 2018-09-23 NOTE — LETTER
September 23, 2018    Layo Haney  [address]  [facility] [address]      Dear Layo Haney:    Your doctor has referred you to the [facility]. You will be contacted by our office and an initial appointment will then be scheduled for you.    We look forward to seeing you soon. If you have any further questions, please contact us at [phone].       Sincerely,        [facility]   [address] [place] [id number]  [phone]

## 2018-09-24 ENCOUNTER — TELEPHONE (OUTPATIENT)
Dept: INTERNAL MEDICINE | Facility: CLINIC | Age: 68
End: 2018-09-24

## 2018-09-24 NOTE — TELEPHONE ENCOUNTER
----- Message from Gemma Dugan sent at 9/24/2018  8:45 AM CDT -----  Contact: friend/sindy/[phone]  Pt friend called in regards to getting a Rx refill for diabetic supplies. She would like to talk to the nurse.      Please advise

## 2018-09-24 NOTE — NURSING
Pt records reviewed.   Pt will be referred to Hepatitis C clinic    Initial referral received  from the [facility]que.   Referring Provider/diagnosis  MONICA SCHULTE Provider:   Diagnosis: Hepatitis C virus infection without hepatic coma, unspecified chronicity         Referral letter sent to  patient.

## 2018-10-01 ENCOUNTER — LAB VISIT (OUTPATIENT)
Dept: LAB | Facility: HOSPITAL | Age: 68
End: 2018-10-01
Payer: COMMERCIAL

## 2018-10-01 ENCOUNTER — INITIAL CONSULT (OUTPATIENT)
Dept: HEPATOLOGY | Facility: CLINIC | Age: 68
End: 2018-10-01
Payer: COMMERCIAL

## 2018-10-01 VITALS
DIASTOLIC BLOOD PRESSURE: 65 MMHG | BODY MASS INDEX: 23.95 KG/M2 | RESPIRATION RATE: 18 BRPM | SYSTOLIC BLOOD PRESSURE: 136 MMHG | HEART RATE: 69 BPM | WEIGHT: 176.81 LBS | TEMPERATURE: 98 F | HEIGHT: 72 IN | OXYGEN SATURATION: 95 %

## 2018-10-01 DIAGNOSIS — R76.8 HEPATITIS C ANTIBODY TEST POSITIVE: Primary | ICD-10-CM

## 2018-10-01 DIAGNOSIS — R76.8 HEPATITIS C ANTIBODY TEST POSITIVE: ICD-10-CM

## 2018-10-01 PROCEDURE — 1100F PTFALLS ASSESS-DOCD GE2>/YR: CPT | Mod: CPTII,S$GLB,, | Performed by: PHYSICIAN ASSISTANT

## 2018-10-01 PROCEDURE — 87522 HEPATITIS C REVRS TRNSCRPJ: CPT

## 2018-10-01 PROCEDURE — 3288F FALL RISK ASSESSMENT DOCD: CPT | Mod: CPTII,S$GLB,, | Performed by: PHYSICIAN ASSISTANT

## 2018-10-01 PROCEDURE — 36415 COLL VENOUS BLD VENIPUNCTURE: CPT

## 2018-10-01 PROCEDURE — 99999 PR PBB SHADOW E&M-EST. PATIENT-LVL V: CPT | Mod: PBBFAC,,, | Performed by: PHYSICIAN ASSISTANT

## 2018-10-01 PROCEDURE — 99203 OFFICE O/P NEW LOW 30 MIN: CPT | Mod: S$GLB,,, | Performed by: PHYSICIAN ASSISTANT

## 2018-10-01 PROCEDURE — 3078F DIAST BP <80 MM HG: CPT | Mod: CPTII,S$GLB,, | Performed by: PHYSICIAN ASSISTANT

## 2018-10-01 PROCEDURE — 3075F SYST BP GE 130 - 139MM HG: CPT | Mod: CPTII,S$GLB,, | Performed by: PHYSICIAN ASSISTANT

## 2018-10-01 NOTE — PROGRESS NOTES
HEPATOLOGY CLINIC VISIT NOTE - HCV clinic    REFERRING PROVIDER: Gemma Boone MD  (here w/ Costa Ledesma, caretaker with whom he resides)    CHIEF COMPLAINT: Hepatitis C     HISTORY: This is a 68 y.o. White male with vascular dementia, seizure disorder, bipolar disorder, a movement disorder (seen by neurology, referred to [facility]), poorly controlled DM (recent HbA1c 10) referred for further eval / mngmt of his HCV infection.     Labs in Epic reveal (+) HCVAB from 2007  No records of HCV RNA  Liver panel normal    Costa reports pt has prior history of drug and alcohol abuse in past.    No jaundice, dark urine, abdominal distention, hematemesis, melena                      Past Medical History:   Diagnosis Date    Bipolar disorder 4/19/2013    GERD (gastroesophageal reflux disease) 4/19/2013    Hepatitis C 4/19/2013    Hypertension 4/19/2013    Seizure disorder 4/19/2013    Type 1 diabetes mellitus 4/19/2013    Vascular dementia, uncomplicated 4/19/2013       No past surgical history on file.      SOCIAL HISTORY:   Social History     Tobacco Use   Smoking Status Former Smoker   Tobacco Comment    3 cigarettes daily now     Alcohol - no longer drinking      ROS:   No fever, chills, weight loss, fatigue  No chest pain, palpitations, dyspnea, cough  No abdominal pain, nausea, vomiting  No skin rashes   No headaches  No lower extremity edema      PHYSICAL EXAM:  Friendly White male, in no acute distress; alert and oriented to person, place and time  VITALS: reviewed  HEENT: Sclerae anicteric.   NECK: Supple  CVS: Regular rate and rhythm. No murmurs  LUNGS: Normal respiratory effort. Clear bilaterally  ABDOMEN: Flat, soft, nontender. No organomegaly or masses. No ascites or hernias. Good bowel sounds.    SKIN: Warm and dry. No jaundice, No obvious rashes.   EXTREMITIES: No lower extremity edema. Left lower arm / wrist in brace  NEURO: Regular movement of head, mouth, arms throughout  visit    RECENT LABS:  Lab Results   Component Value Date    WBC 5.57 09/10/2018    HGB 12.5 (L) 09/10/2018     09/10/2018     Lab Results   Component Value Date    INR 1.0 04/07/2009     Lab Results   Component Value Date    AST 18 09/10/2018    ALT 25 09/10/2018    BILITOT 0.4 09/10/2018    ALBUMIN 3.6 09/10/2018    ALKPHOS 99 09/10/2018    CREATININE 0.8 09/10/2018    BUN 17 09/10/2018     09/10/2018    K 4.6 09/10/2018    AFP 3.0 08/17/2010         ASSESSMENT  68 y.o. White male with:  1. POSITIVVE HEPATITIS C ANTIBODY  -- r/o chronic HCV      EDUCATION:  The significance of HCV antibody & natural history of Hepatitis C & reviewed. Discussed that additional labs needed to determine if pt is currently infected or if he cleared virus on his own in the past.   Transmission of Hepatitis C was reviewed, including possible sexual transmission.  Patient should avoid sharing personal products such as razors, toothbrushes, etc.     *Caretaker states she has accidentally stuck herself w/ needle after checking pt's sugar and giving insulin. Encouraged her to be screened for HCV and discussed importance of precautions to prevent this         PLAN:  HCV Genotype  Further recommendations to follow.  - If HCV RNA neg, no further eval needed  - If HCV RNA pos, pt will need additional labs / imaging / liver staging and f/u visit to discuss HCV rx.

## 2018-10-01 NOTE — LETTER
October 1, 2018      Gemma Boone MD  [address]  [facility] [address]           [facility] - Hepatitis C  [address]  [facility] [address]  Phone: [phone]  Fax: [phone]          Patient: Layo Haney   MR Number: [id number]   YOB: 1950   Date of Visit: 10/1/2018       Dear Dr. Gemma Boone:    Thank you for referring Layo Haney to me for evaluation. Attached you will find relevant portions of my assessment and plan of care.    If you have questions, please do not hesitate to call me. I look forward to following Layo Haney along with you.    Sincerely,    Jennifer B. Scheuermann, PA    Enclosure  CC:  No Recipients    If you would like to receive this communication electronically, please contact [email or web address] or [phone] to request more information on [employer] access.    For providers and/or their staff who would like to refer a patient to [facility], please contact us through our one-stop-shop provider referral line, [facility] , at [phone].    If you feel you have received this communication in error or would no longer like to receive these types of communications, please e-mail [email or web address]

## 2018-10-03 ENCOUNTER — TELEPHONE (OUTPATIENT)
Dept: HEPATOLOGY | Facility: CLINIC | Age: 68
End: 2018-10-03

## 2018-10-03 LAB
HCV GENTYP SERPL NAA+PROBE: NORMAL
HCV RNA SERPL NAA+PROBE-LOG IU: <1.08 LOG (10) IU/ML
HCV RNA SERPL QL NAA+PROBE: NOT DETECTED
HCV RNA SPEC NAA+PROBE-ACNC: <12 IU/ML

## 2018-10-03 NOTE — TELEPHONE ENCOUNTER
pts caretaker informed via msg left on pts carewtakers VM today. Asked them to call back with any questions or concersn

## 2018-10-03 NOTE — TELEPHONE ENCOUNTER
pls tell caretaker Costa that pt's Hep C virus test is negative.  Pt does NOT have Hep C virus present in his blood now.  Appears he was infected in the past but got over the infection. No need for further testing.    thanks

## 2018-11-13 RX ORDER — DIVALPROEX SODIUM 500 MG/1
TABLET, FILM COATED, EXTENDED RELEASE ORAL
Qty: 30 TABLET | Refills: 11 | Status: SHIPPED | OUTPATIENT
Start: 2018-11-13 | End: 2019-10-21

## 2018-12-14 ENCOUNTER — TELEPHONE (OUTPATIENT)
Dept: INTERNAL MEDICINE | Facility: CLINIC | Age: 68
End: 2018-12-14

## 2018-12-14 NOTE — TELEPHONE ENCOUNTER
----- Message from Adriana Orozco sent at 12/14/2018  2:36 PM CST -----  Contact: Elvira Valles [phone]  Pharmacist is calling about a problem with patient's taking of Patient's  insulin aspart U-100 (NOVOLOG FLEXPEN U-100 INSULIN) 100     [facility] [place] [facility] [place] - [address] [phone] (Phone)  [phone] (Fax)    Caretaker gives 5 units of Novolog with meals, 5 -6 units depending on number. Should patient be taking with meals and snacks. What should be dispensed based on information.

## 2018-12-28 DIAGNOSIS — Z13.6 SCREENING FOR AAA (AORTIC ABDOMINAL ANEURYSM): ICD-10-CM

## 2018-12-28 DIAGNOSIS — Z12.11 COLON CANCER SCREENING: ICD-10-CM

## 2019-03-14 DIAGNOSIS — E10.8 TYPE 1 DIABETES MELLITUS WITH COMPLICATION: ICD-10-CM

## 2019-03-14 RX ORDER — INSULIN ASPART 100 [IU]/ML
INJECTION, SOLUTION INTRAVENOUS; SUBCUTANEOUS
Qty: 15 ML | Refills: 6 | Status: SHIPPED | OUTPATIENT
Start: 2019-03-14 | End: 2019-10-21 | Stop reason: SDUPTHER

## 2019-03-18 ENCOUNTER — TELEPHONE (OUTPATIENT)
Dept: INTERNAL MEDICINE | Facility: CLINIC | Age: 69
End: 2019-03-18

## 2019-03-18 NOTE — TELEPHONE ENCOUNTER
----- Message from Vicky España sent at 3/18/2019 10:31 AM CDT -----  Contact: Elvira [phone]  Prescription Request:     Name of medication:  BD INS PED NDL U'F III    Reason for request: Refill    [place] [facility] [place] [address] [place]    Please advise.    Thank You

## 2019-04-12 ENCOUNTER — TELEPHONE (OUTPATIENT)
Dept: INTERNAL MEDICINE | Facility: CLINIC | Age: 69
End: 2019-04-12

## 2019-04-12 ENCOUNTER — HOSPITAL ENCOUNTER (EMERGENCY)
Facility: HOSPITAL | Age: 69
Discharge: HOME | End: 2019-04-12
Attending: EMERGENCY MEDICINE
Payer: COMMERCIAL

## 2019-04-12 VITALS
DIASTOLIC BLOOD PRESSURE: 92 MMHG | TEMPERATURE: 99 F | OXYGEN SATURATION: 98 % | RESPIRATION RATE: 18 BRPM | WEIGHT: 170 LBS | SYSTOLIC BLOOD PRESSURE: 178 MMHG | HEIGHT: 72 IN | HEART RATE: 96 BPM | BODY MASS INDEX: 23.03 KG/M2

## 2019-04-12 DIAGNOSIS — S22.42XA CLOSED FRACTURE OF MULTIPLE RIBS OF LEFT SIDE, INITIAL ENCOUNTER: Primary | ICD-10-CM

## 2019-04-12 DIAGNOSIS — S29.8XXA BLUNT TRAUMA TO CHEST: ICD-10-CM

## 2019-04-12 PROCEDURE — 25000003 PHARM REV CODE 250: Performed by: EMERGENCY MEDICINE

## 2019-04-12 PROCEDURE — 82962 GLUCOSE BLOOD TEST: CPT

## 2019-04-12 PROCEDURE — 93005 ELECTROCARDIOGRAM TRACING: CPT

## 2019-04-12 PROCEDURE — 99285 EMERGENCY DEPT VISIT HI MDM: CPT | Mod: ,,, | Performed by: EMERGENCY MEDICINE

## 2019-04-12 PROCEDURE — 99284 EMERGENCY DEPT VISIT MOD MDM: CPT | Mod: 25

## 2019-04-12 PROCEDURE — 99285 PR EMERGENCY DEPT VISIT,LEVEL V: ICD-10-PCS | Mod: ,,, | Performed by: EMERGENCY MEDICINE

## 2019-04-12 PROCEDURE — 93010 EKG 12-LEAD: ICD-10-PCS | Mod: ,,, | Performed by: INTERNAL MEDICINE

## 2019-04-12 PROCEDURE — 93010 ELECTROCARDIOGRAM REPORT: CPT | Mod: ,,, | Performed by: INTERNAL MEDICINE

## 2019-04-12 RX ORDER — LORAZEPAM 1 MG/1
1 TABLET ORAL
Status: COMPLETED | OUTPATIENT
Start: 2019-04-12 | End: 2019-04-12

## 2019-04-12 RX ORDER — OXYCODONE AND ACETAMINOPHEN 5; 325 MG/1; MG/1
1 TABLET ORAL
Status: COMPLETED | OUTPATIENT
Start: 2019-04-12 | End: 2019-04-12

## 2019-04-12 RX ORDER — OXYCODONE AND ACETAMINOPHEN 5; 325 MG/1; MG/1
1 TABLET ORAL EVERY 4 HOURS PRN
Qty: 12 TABLET | Refills: 0 | Status: SHIPPED | OUTPATIENT
Start: 2019-04-12 | End: 2019-10-21

## 2019-04-12 RX ADMIN — OXYCODONE HYDROCHLORIDE AND ACETAMINOPHEN 1 TABLET: 5; 325 TABLET ORAL at 03:04

## 2019-04-12 RX ADMIN — LORAZEPAM 1 MG: 1 TABLET ORAL at 05:04

## 2019-04-12 NOTE — ED PROVIDER NOTES
Encounter Date: 4/12/2019    SCRIBE #1 NOTE: I, Gurinder Cruz, am scribing for, and in the presence of,  Dr. Perez. I have scribed the following portions of the note - the EKG reading. Other sections scribed: HPI, ROS, MDM.       History     Chief Complaint   Patient presents with    Rib Injury From Fall     pt brought to ed by ems from home. family member reports pt fell twice today, injured left rib area.      Layo Haney is a 68 y.o. male with PMHx of DM and bipolar disorder who presents to the ED via EMS with a chief complaint of constant L rib pain after a fall the other day. Associated symptoms include L chest pain. Denies abdominal pain, scrapes, bruises, or head trauma. He does have bandages on his R arm that patient is unaware how he got them. No modifying factors.    The history is provided by the patient, the EMS personnel and medical records. The history is limited by the absence of a caregiver.     Review of patient's allergies indicates:  No Known Allergies  Past Medical History:   Diagnosis Date    Bipolar disorder 4/19/2013    GERD (gastroesophageal reflux disease) 4/19/2013    Hepatitis C antibody (+) - appears he cleared virus on his own. no need for treatment. 4/19/2013    Hypertension 4/19/2013    Seizure disorder 4/19/2013    Type 1 diabetes mellitus 4/19/2013    Vascular dementia, uncomplicated 4/19/2013     History reviewed. No pertinent surgical history.  History reviewed. No pertinent family history.  Social History     Tobacco Use    Smoking status: Former Smoker    Tobacco comment: 3 cigarettes daily now   Substance Use Topics    Alcohol use: No    Drug use: No     Review of Systems   Constitutional: Negative for fever.   HENT: Negative for sore throat.    Eyes: Negative for redness.   Respiratory: Negative for cough.    Cardiovascular: Positive for chest pain (left).   Gastrointestinal: Negative for vomiting.   Genitourinary: Negative for frequency.   Musculoskeletal:  Negative for neck stiffness.        Positive for L rib pain.   Skin: Negative for rash and wound.   Neurological: Negative for syncope.       Physical Exam     Initial Vitals [04/12/19 1338]   BP Pulse Resp Temp SpO2   (!) 148/85 99 18 98.2 °F (36.8 °C) 95 %      MAP       --         Physical Exam    Nursing note and vitals reviewed.  Constitutional: He appears ill.   Agitated though cooperative   HENT:   Head: Normocephalic and atraumatic.   Mouth/Throat: Mucous membranes are normal.   Eyes: EOM are normal. No scleral icterus.   Neck: Normal range of motion. Neck supple. No tracheal deviation present.   Cardiovascular: Normal rate and regular rhythm.   Pulmonary/Chest:   Left chest wall tenderness to palpation   Abdominal: He exhibits no distension. There is no tenderness.   Neurological: He is alert. He has normal strength. No cranial nerve deficit. GCS eye subscore is 4. GCS verbal subscore is 5. GCS motor subscore is 6.   Skin: Skin is warm and dry.         ED Course   Procedures  Labs Reviewed   POCT GLUCOSE - Abnormal; Notable for the following components:       Result Value    POCT Glucose 358 (*)     All other components within normal limits     EKG Readings: (Independently Interpreted)   Initial Reading: No STEMI. Rhythm: Sinus Tachycardia. Heart Rate: 104 bpm. Ectopy: No Ectopy.     ECG Results          EKG 12-lead (Final result)  Result time 04/15/19 13:28:12    Final result by Interface, Lab In [facility] (04/15/19 13:28:12)                 Narrative:    Test Reason : S29.8XXA,    Vent. Rate : 104 BPM     Atrial Rate : 104 BPM     P-R Int : 144 ms          QRS Dur : 088 ms      QT Int : 358 ms       P-R-T Axes : 069 065 052 degrees     QTc Int : 470 ms    Sinus tachycardia  ST and T wave abnormality, consider anterior ischemia  Abnormal ECG  When compared with ECG of 12-MAY-2012 15:13,  Vent. rate has increased BY  45 BPM  T wave inversion now evident in Inferior leads  T wave inversion now evident in  Anterior leads  Confirmed by MARYANN CHAVEZ MD ([id number]) on 4/15/2019 1:28:04 PM    Referred By: [email or web address]   SELF           Confirmed By:MARYANN CHAVEZ MD                            Imaging Results          CT Chest Without Contrast (Final result)  Result time 04/12/19 18:09:15    Final result by Filiberto Danielson II, MD (04/12/19 18:09:15)                 Impression:      Left 4th and 5th rib fractures, as above.    Probable left lower lobe atelectasis. Hemorrhage and contusion are felt to be less likely.    Small trapped pericardial effusion of uncertain significance.    Mildly fluid distended esophagus and a small hiatal hernia.    Electronically signed by resident: Wallace Gomez  Date:    04/12/2019  Time:    17:21    Electronically signed by: Filiberto Danielson  Date:    04/12/2019  Time:    18:09             Narrative:    EXAMINATION:  CT CHEST WITHOUT CONTRAST    CLINICAL HISTORY:  chest trauma from fall;    TECHNIQUE:  Low dose axial images, sagittal and coronal reformations were obtained from the thoracic inlet to the lung bases. Contrast was not administered.    COMPARISON:  Chest radiograph 04/12/2019    FINDINGS:  Base of neck: No significant abnormality.    Thoracic soft tissues: Normal.    Aorta: Left-sided aortic arch.  No aneurysm.  Scattered calcified plaque.  Coronary artery calcifications are present.    Heart: Heart size normal.  Small pericardial effusion which appears trapped anteriorly.    Pulmonary vasculature: Pulmonary arteries distribute normally.  Four pulmonary veins.    Shavon/Mediastinum: No pathologic aakash enlargement.    Airways: Patent.    Lungs/Pleura: Left lower lobe airspace opacity.  Scattered atelectatic changes.  No pleural effusion or pneumothorax.    Esophagus: Mildly distended with fluid.  Small hiatal hernia.    Upper Abdomen: The spleen has a lobulated appearance which may reflect prior trauma.  Acute traumatic injury is felt to be less likely given that there is no significant  perisplenic fluid.    Bones: Lateral and posterior 4th rib fractures.  Posterior 5th rib fracture.  No significant displacement.                               X-Ray Chest 1 View (Final result)  Result time 04/12/19 16:04:32   Procedure changed from X-Ray Chest PA And Lateral     Final result by Joan Calderon MD (04/12/19 16:04:32)                 Impression:      Left rib fractures.      Electronically signed by: Joan Calderon MD  Date:    04/12/2019  Time:    16:04             Narrative:    EXAMINATION:  XR CHEST 1 VIEW    CLINICAL HISTORY:  Blunt trauma to the chest; Other specified injuries of thorax, initial encounter    TECHNIQUE:  Single frontal view of the chest was performed.    COMPARISON:  Left rib detail views 05/03/2012.    FINDINGS:  X-ray beam attenuation and scatter occur in generous overlying soft tissues.    Mediastinal structures are midline. Cardiac silhouette and pulmonary vascular distribution are normal.    Lung volumes are normal and symmetric. I detect no pulmonary disease, pleural fluid, lymph node enlargement, cardiac decompensation, pneumothorax, pneumomediastinum or pneumoperitoneum.    There is no mediastinal widening and no displacement of paraspinous soft tissues.  Left mainstem occupies its normal location.    There are fractures of the posterior portions of the left 4th and 5th ribs; these fractures are new since the most recent examination dated 05/03/2012.                              X-Rays:   Independently Interpreted Readings:   Other Readings:  CT Chest without contrast read by radiology showing fractures of the 4th rib both laterally and posteriorly and posterior 5th rib fracture. No pneumothorax and hemorrhage.     Medical Decision Making:   History:   Old Medical Records: I decided to obtain old medical records.  Old Records Summarized: other records and records from clinic visits.       <> Summary of Records: Reviewed telephone encounter between caregiver and PCP  saying patient fell the other day. Caregiver was asking if she should call the ambulance because she was unable to  the patient and he is in a lot of pain.   Initial Assessment:   Patient is a 64 year old male with Hx of DM, bipolar disorder, hepatitis C, seizure disorder, and vascular dimentia, presents via EMS after complaining of rib pain after a fall the other day.   Independently Interpreted Test(s):   I have ordered and independently interpreted X-rays - see prior notes.  I have ordered and independently interpreted EKG Reading(s) - see prior notes  Clinical Tests:   Lab Tests: Ordered and Reviewed  Radiological Study: Ordered and Reviewed  Medical Tests: Ordered and Reviewed            Scribe Attestation:   Scribe #1: I performed the above scribed service and the documentation accurately describes the services I performed. I attest to the accuracy of the note.    Attending Attestation:             Attending ED Notes:   68 year old male who apparently fell on his left side complaining of left rib pain. Was sent in by caregiver. Denies sob or other issues. Patient was evaluated in ED. X-Ray was difficult because of agitation. Chest CT was done showing 2 rib fractures. No associated pneumothorax or collection of fluid in chest. Patient was given a percocet and po ativan because of his agitation which has continued somewhat. He will be discharged and given small prescription of percocet.              Clinical Impression:       ICD-10-CM ICD-9-CM   1. Closed fracture of multiple ribs of left side, initial encounter S22.42XA 807.09   2. Blunt trauma to chest S29.8XXA 959.11         Disposition:   Disposition: Discharged  Condition: Stable                        Andrzej Perez MD  04/18/19 [id number]

## 2019-04-12 NOTE — ED NOTES
Dr. Calderon notified that patient is a diabetic according to Costa, ioana, who called on the phone. Md ordered to check glucose. Dr. Calderon notified that POCT glucose is 358. No s/s of distress noted with even unlabored respirations. Will continue to monitor.

## 2019-04-12 NOTE — TELEPHONE ENCOUNTER
----- Message from Desmond Morgan sent at 4/12/2019 12:10 PM CDT -----  Contact: Care taker, Costa Ledesma [phone]  Patient would like to get medical advice.    Comments:  Stating patient fell the other day and wants to know what the Dr advice for the patient, if should go to the hospital, stating patient felt nauseous, side of body is aching. Care taker, Costa Ledesma [phone], would like a call back asap, wants to know if the ambulance should come to  the patient, stating to heavy to put in care if needing to go to the hospital.    Please call an advise  Thank you

## 2019-04-12 NOTE — ED NOTES
Dr. Calderon notified that patient's /90 and that patient was restless and slightly agitated during vitals assessment. No s/s of distress noted with even and unlabored respirations noted. Stretcher locked in lowest position, side rails up x2, fall risk band on; call light in reach and instructed to call for assistance. Will continue to monitor.

## 2019-04-12 NOTE — DISCHARGE INSTRUCTIONS
You have two broken ribs on the left  Take the pain medication as needed and use ibuprofen in between doses

## 2019-04-13 LAB — POCT GLUCOSE: 358 MG/DL (ref 70–110)

## 2019-04-13 NOTE — ED NOTES
Talked to Costa, patient's sitter and she gave me address of [facility] to get the patient transported to

## 2019-05-10 DIAGNOSIS — E11.9 TYPE 2 DIABETES MELLITUS WITHOUT COMPLICATION: ICD-10-CM

## 2019-07-08 DIAGNOSIS — G25.5 CHOREA: ICD-10-CM

## 2019-07-08 RX ORDER — OLANZAPINE 2.5 MG/1
2.5 TABLET ORAL 2 TIMES DAILY
Qty: 60 TABLET | Refills: 11 | Status: SHIPPED | OUTPATIENT
Start: 2019-07-08 | End: 2019-10-21

## 2019-08-09 RX ORDER — MUPIROCIN 20 MG/G
OINTMENT TOPICAL
Qty: 90 G | Refills: 3 | Status: SHIPPED | OUTPATIENT
Start: 2019-08-09 | End: 2019-10-21 | Stop reason: SDUPTHER

## 2019-08-30 ENCOUNTER — TELEPHONE (OUTPATIENT)
Dept: INTERNAL MEDICINE | Facility: CLINIC | Age: 69
End: 2019-08-30

## 2019-08-30 DIAGNOSIS — E10.8 TYPE 1 DIABETES MELLITUS WITH COMPLICATION: Primary | ICD-10-CM

## 2019-08-30 NOTE — TELEPHONE ENCOUNTER
----- Message from Lucio Sauceda sent at 8/30/2019  1:20 PM CDT -----  Contact: Caregiver/ Costa [phone]  Type: Orders Request    What orders/ testing are being requested? Annual    Is there a future appointment scheduled for the patient with PCP? Yes    When? 10/21/19

## 2019-09-23 RX ORDER — DIVALPROEX SODIUM 250 MG/1
250 TABLET, FILM COATED, EXTENDED RELEASE ORAL DAILY
Qty: 30 TABLET | Refills: 11 | Status: SHIPPED | OUTPATIENT
Start: 2019-09-23 | End: 2019-10-21

## 2019-09-23 RX ORDER — OMEPRAZOLE 20 MG/1
20 CAPSULE, DELAYED RELEASE ORAL DAILY
Qty: 30 CAPSULE | Refills: 11 | Status: SHIPPED | OUTPATIENT
Start: 2019-09-23 | End: 2020-09-11

## 2019-09-23 RX ORDER — FLUOXETINE HYDROCHLORIDE 20 MG/1
20 CAPSULE ORAL DAILY
Qty: 30 CAPSULE | Refills: 11 | Status: SHIPPED | OUTPATIENT
Start: 2019-09-23 | End: 2020-09-11

## 2019-09-23 RX ORDER — AMLODIPINE BESYLATE 10 MG/1
10 TABLET ORAL DAILY
Qty: 90 TABLET | Refills: 5 | Status: SHIPPED | OUTPATIENT
Start: 2019-09-23 | End: 2020-10-20

## 2019-09-25 RX ORDER — ENALAPRIL MALEATE 20 MG/1
20 TABLET ORAL 2 TIMES DAILY
Qty: 60 TABLET | Refills: 11 | Status: SHIPPED | OUTPATIENT
Start: 2019-09-25 | End: 2020-10-08

## 2019-10-14 ENCOUNTER — LAB VISIT (OUTPATIENT)
Dept: LAB | Facility: HOSPITAL | Age: 69
End: 2019-10-14
Attending: INTERNAL MEDICINE
Payer: COMMERCIAL

## 2019-10-14 DIAGNOSIS — E10.8 TYPE 1 DIABETES MELLITUS WITH COMPLICATION: ICD-10-CM

## 2019-10-14 LAB
ALBUMIN SERPL BCP-MCNC: 3.4 G/DL (ref 3.5–5.2)
ALP SERPL-CCNC: 93 U/L (ref 55–135)
ALT SERPL W/O P-5'-P-CCNC: 10 U/L (ref 10–44)
ANION GAP SERPL CALC-SCNC: 10 MMOL/L (ref 8–16)
AST SERPL-CCNC: 11 U/L (ref 10–40)
BASOPHILS # BLD AUTO: 0.03 K/UL (ref 0–0.2)
BASOPHILS NFR BLD: 0.6 % (ref 0–1.9)
BILIRUB SERPL-MCNC: 0.3 MG/DL (ref 0.1–1)
BUN SERPL-MCNC: 10 MG/DL (ref 8–23)
CALCIUM SERPL-MCNC: 9 MG/DL (ref 8.7–10.5)
CHLORIDE SERPL-SCNC: 103 MMOL/L (ref 95–110)
CHOLEST SERPL-MCNC: 137 MG/DL (ref 120–199)
CHOLEST/HDLC SERPL: 2.3 {RATIO} (ref 2–5)
CO2 SERPL-SCNC: 27 MMOL/L (ref 23–29)
CREAT SERPL-MCNC: 0.8 MG/DL (ref 0.5–1.4)
DIFFERENTIAL METHOD: ABNORMAL
EOSINOPHIL # BLD AUTO: 0.3 K/UL (ref 0–0.5)
EOSINOPHIL NFR BLD: 5.4 % (ref 0–8)
ERYTHROCYTE [DISTWIDTH] IN BLOOD BY AUTOMATED COUNT: 13.8 % (ref 11.5–14.5)
EST. GFR  (AFRICAN AMERICAN): >60 ML/MIN/1.73 M^2
EST. GFR  (NON AFRICAN AMERICAN): >60 ML/MIN/1.73 M^2
ESTIMATED AVG GLUCOSE: 237 MG/DL (ref 68–131)
GLUCOSE SERPL-MCNC: 278 MG/DL (ref 70–110)
HBA1C MFR BLD HPLC: 9.9 % (ref 4–5.6)
HCT VFR BLD AUTO: 38.2 % (ref 40–54)
HDLC SERPL-MCNC: 59 MG/DL (ref 40–75)
HDLC SERPL: 43.1 % (ref 20–50)
HGB BLD-MCNC: 12.3 G/DL (ref 14–18)
IMM GRANULOCYTES # BLD AUTO: 0.01 K/UL (ref 0–0.04)
IMM GRANULOCYTES NFR BLD AUTO: 0.2 % (ref 0–0.5)
LDLC SERPL CALC-MCNC: 62.2 MG/DL (ref 63–159)
LYMPHOCYTES # BLD AUTO: 2.1 K/UL (ref 1–4.8)
LYMPHOCYTES NFR BLD: 39.7 % (ref 18–48)
MCH RBC QN AUTO: 29.1 PG (ref 27–31)
MCHC RBC AUTO-ENTMCNC: 32.2 G/DL (ref 32–36)
MCV RBC AUTO: 91 FL (ref 82–98)
MONOCYTES # BLD AUTO: 0.5 K/UL (ref 0.3–1)
MONOCYTES NFR BLD: 10 % (ref 4–15)
NEUTROPHILS # BLD AUTO: 2.3 K/UL (ref 1.8–7.7)
NEUTROPHILS NFR BLD: 44.1 % (ref 38–73)
NONHDLC SERPL-MCNC: 78 MG/DL
NRBC BLD-RTO: 0 /100 WBC
PLATELET # BLD AUTO: 199 K/UL (ref 150–350)
PMV BLD AUTO: 12.2 FL (ref 9.2–12.9)
POTASSIUM SERPL-SCNC: 4.3 MMOL/L (ref 3.5–5.1)
PROT SERPL-MCNC: 6.6 G/DL (ref 6–8.4)
RBC # BLD AUTO: 4.22 M/UL (ref 4.6–6.2)
SODIUM SERPL-SCNC: 140 MMOL/L (ref 136–145)
TRIGL SERPL-MCNC: 79 MG/DL (ref 30–150)
TSH SERPL DL<=0.005 MIU/L-ACNC: 2.11 UIU/ML (ref 0.4–4)
WBC # BLD AUTO: 5.21 K/UL (ref 3.9–12.7)

## 2019-10-14 PROCEDURE — 36415 COLL VENOUS BLD VENIPUNCTURE: CPT | Mod: PO

## 2019-10-14 PROCEDURE — 85025 COMPLETE CBC W/AUTO DIFF WBC: CPT

## 2019-10-14 PROCEDURE — 80061 LIPID PANEL: CPT

## 2019-10-14 PROCEDURE — 84443 ASSAY THYROID STIM HORMONE: CPT

## 2019-10-14 PROCEDURE — 83036 HEMOGLOBIN GLYCOSYLATED A1C: CPT

## 2019-10-14 PROCEDURE — 80053 COMPREHEN METABOLIC PANEL: CPT

## 2019-10-21 ENCOUNTER — IMMUNIZATION (OUTPATIENT)
Dept: INTERNAL MEDICINE | Facility: CLINIC | Age: 69
End: 2019-10-21
Payer: COMMERCIAL

## 2019-10-21 ENCOUNTER — OFFICE VISIT (OUTPATIENT)
Dept: INTERNAL MEDICINE | Facility: CLINIC | Age: 69
End: 2019-10-21

## 2019-10-21 VITALS
DIASTOLIC BLOOD PRESSURE: 74 MMHG | HEART RATE: 75 BPM | WEIGHT: 171.5 LBS | OXYGEN SATURATION: 98 % | SYSTOLIC BLOOD PRESSURE: 130 MMHG | TEMPERATURE: 99 F | BODY MASS INDEX: 22.73 KG/M2 | HEIGHT: 73 IN

## 2019-10-21 DIAGNOSIS — Z23 NEED FOR PNEUMOCOCCAL VACCINATION: Primary | ICD-10-CM

## 2019-10-21 DIAGNOSIS — Z00.00 ROUTINE GENERAL MEDICAL EXAMINATION AT A HEALTH CARE FACILITY: ICD-10-CM

## 2019-10-21 DIAGNOSIS — E10.8 TYPE 1 DIABETES MELLITUS WITH COMPLICATION: ICD-10-CM

## 2019-10-21 PROCEDURE — 99999 PR PBB SHADOW E&M-EST. PATIENT-LVL IV: CPT | Mod: PBBFAC,,, | Performed by: INTERNAL MEDICINE

## 2019-10-21 PROCEDURE — 99999 PR PBB SHADOW E&M-EST. PATIENT-LVL IV: ICD-10-PCS | Mod: PBBFAC,,, | Performed by: INTERNAL MEDICINE

## 2019-10-21 PROCEDURE — 99397 PER PM REEVAL EST PAT 65+ YR: CPT | Mod: S$PBB,,, | Performed by: INTERNAL MEDICINE

## 2019-10-21 PROCEDURE — 90471 IMMUNIZATION ADMIN: CPT | Mod: PBBFAC

## 2019-10-21 PROCEDURE — 99397 PR PREVENTIVE VISIT,EST,65 & OVER: ICD-10-PCS | Mod: S$PBB,,, | Performed by: INTERNAL MEDICINE

## 2019-10-21 RX ORDER — DIVALPROEX SODIUM 500 MG/1
500 TABLET, FILM COATED, EXTENDED RELEASE ORAL NIGHTLY
Qty: 30 TABLET | Refills: 11 | Status: SHIPPED | OUTPATIENT
Start: 2019-10-21 | End: 2020-11-17

## 2019-10-21 RX ORDER — BENZTROPINE MESYLATE 0.5 MG/1
0.5 TABLET ORAL 2 TIMES DAILY
Qty: 60 TABLET | Refills: 11 | Status: SHIPPED | OUTPATIENT
Start: 2019-10-21 | End: 2020-10-08

## 2019-10-21 RX ORDER — KETOCONAZOLE 20 MG/ML
SHAMPOO, SUSPENSION TOPICAL
Qty: 240 ML | Refills: 3 | Status: SHIPPED | OUTPATIENT
Start: 2019-10-21 | End: 2020-04-27

## 2019-10-21 RX ORDER — MUPIROCIN 20 MG/G
OINTMENT TOPICAL
Qty: 180 G | Refills: 3 | Status: SHIPPED | OUTPATIENT
Start: 2019-10-21 | End: 2021-05-28

## 2019-10-21 RX ORDER — DIVALPROEX SODIUM 500 MG/1
TABLET, FILM COATED, EXTENDED RELEASE ORAL
Qty: 30 TABLET | Refills: 11 | Status: SHIPPED | OUTPATIENT
Start: 2019-10-21 | End: 2020-07-08 | Stop reason: SDUPTHER

## 2019-10-21 RX ORDER — INSULIN GLARGINE 100 [IU]/ML
INJECTION, SOLUTION SUBCUTANEOUS
Qty: 15 ML | Refills: 6 | Status: SHIPPED | OUTPATIENT
Start: 2019-10-21 | End: 2020-11-24

## 2019-10-21 RX ORDER — ACETAMINOPHEN 500 MG
1000 TABLET ORAL EVERY 8 HOURS PRN
Qty: 100 TABLET | Refills: 3 | Status: SHIPPED | OUTPATIENT
Start: 2019-10-21

## 2019-10-21 RX ORDER — ROSUVASTATIN CALCIUM 10 MG/1
10 TABLET, COATED ORAL NIGHTLY
Qty: 30 TABLET | Refills: 11 | Status: SHIPPED | OUTPATIENT
Start: 2019-10-21 | End: 2020-10-08

## 2019-10-21 RX ORDER — INSULIN ASPART 100 [IU]/ML
INJECTION, SOLUTION INTRAVENOUS; SUBCUTANEOUS
Qty: 30 ML | Refills: 6 | Status: SHIPPED | OUTPATIENT
Start: 2019-10-21 | End: 2020-11-24

## 2019-10-21 NOTE — PROGRESS NOTES
CHIEF COMPLAINT: Annual exam    HISTORY OF PRESENT ILLNESS: 69-year-old man presents for an annual exam.      He has been doing well. He lives with his Costa Ledesma ([phone]), who helps him with his medications and meals. Most of the history is obtained from Costa due to his dementia. He is happy living with Costa. He is eating well.      HE is now taking ashwagandha (orgainic ashwagandha 380 mg plus ashwagandha extract 95 mg) one capsule twice daily and Holy Basil Leaf (holy basil leaf extract 292 mg and holy basil leaf supercritical extract 190 mg) one capsule twice daily.  HE is also taking Melatonin 10 mg 2-3 times daily which helps calm him. He is sleeping well. He has less movement of arms, legs, tongue.  he is sitting still and can hold a short conversation.   Memory is better. HE is more aware.  Mood is better on the supplements. HE is calmer.  Disposition is better. He rarely gets irritable. Anxiety is better. Swallowing is better. No chocking.  He no longer worries about things.   HE has a sense of humor.  He saw neurology 5/21/18 who recommened Zyprexa 2.5 mg twice daily which made things worse. HE was hearing voices so Costa stopped Zyprexa medication.      He is currently taking Lantus 15 units in the evening and Novolog 5-10  units plus sliding scale before meals. Blood sugars have been higher.  Costa gives him extra Lanuts twice a week 8-10 units extra.. No fever, chills, nausea, voimiting, constipation, diarrhea. No significant hypoglycemia     No dysuria, hematuria, nocturia, straining or incomplete void, chest pain, shortness of breath, rhinorrhea, headache, blurred vision, hearing loss. He continues to take Crestor 10 mg daily for his hyperlipidemia. He denies any joint pain, muscle pain. He continues to take enalapril 20 mg twice daily and Norvasc 10 mg daily for his hypertension. He is on omeprazole 20 mg once daily for reflux and has not had any reflux symptoms.     HE is currently taking  "prozac 20 mg daily, depakote 250 mg in am and 500 mg in the evening and cogentin 0.5 mg twice daily for his mood and seizure disorder. He has not had any seizures. Mood is good. HE gets irritable at times and is redirectable.   He is not paranoid.  He sleeps well.  No depression.         REVIEW OF SYSTEMS: She denies fevers, chills, night sweats, fatigue, visual change, hearing loss, sinus congestion, sore throat, chest pain, shortness of breath, nausea, vomiting, constipation, diarrhea, dysuria, hematuria, polydipsia, polyuria, joint pain, muscle pain, headaches.     He tripped and fell last week. NO loss of consciousness.  He is doing well.     PAST MEDICAL HISTORY:   1. Vascular dementia.   2. Type I diabetes.   3. Seizure disorder.   4. History of polysubstance abuse.   5. Recovered alcoholic   6. Bipolar disorder.   7. History of renal insufficiency.   8. Hepatitis C.   9. History of left a left upper extremity DVT January 2007 after an IV.   10. First cervical spine arthritis on C6-C7 on x-ray.   11. Hospitalization July 2008 due to ingestion of caustic substance, which caused erosive esophagitis.    12. History of her aspiration during hospitalization 7/2008.     MEDICATIONS and ALLERGIES: Updated on epic.     PHYSICAL EXAMINATION:   /74 (BP Location: Right arm, Patient Position: Sitting, BP Method: Large (Manual))   Pulse 75   Temp 98.7 °F (37.1 °C) (Oral)   Ht 6' 1" (1.854 m)   Wt 77.8 kg (171 lb 8.3 oz)   SpO2 98%   BMI 22.63 kg/m²   General: Alert, oriented. No apparent distress. Affect within normal limits.   Conjunctivae anicteric. Tympanic membranes clear. Oropharynx clear. Poor dentition  Neck supple.   Respiratory effort normal. Lungs clear bilaterally. Good air flow  Heart: Regular rate and rhythm without murmurs, gallops or rubs.   No lower extremity edema.   Abdomen: Soft, nondistended, nontender. Bowel sounds present. No   hepatosplenomegaly.    Constant movement of arms, legs, lips " and tongue - not as bad. He could stay in the chair     Dandruff on scalp    Protective Sensation (w/ 10 gram monofilament):  Right: Decreased  Left: Decreased    Visual Inspection:  Onychomycosis -  Bilateral    Pedal Pulses:   Right: Present  Left: Present    Posterior tibialis:   Right:Present  Left: Present         ASSESSMENT AND PLAN:    Annual exam  1. Type I diabetes - watch diet. Increase Lantus to 17 -20 units every night,To use sliding scale insulin - declines dietician.Declines free style abdi sensor  2. Hypertension - controlled.   3. Hyperlipidemia - controlled  4. Hepatitis C -saw hepatology 10/2018. Hepatitis C RNA negative.   5. Bipolar disorder - mood better    7. COPD - has quit  9. Screening - . Pt declines colonscopy.  10. Movement disorder - better with supplements  11. Seborrehic dermatitis - ketoconazole shampoo  Cannot do colonoscopy prep  I'll see him back in 3 months, sooner if problems arise  Caregiver does not want him to come in 3 months but 6 months  Pneumovax 2015   and flu today

## 2019-12-10 DIAGNOSIS — E10.9 TYPE 1 DIABETES MELLITUS WITHOUT COMPLICATION: Primary | ICD-10-CM

## 2019-12-12 RX ORDER — LANCETS
EACH MISCELLANEOUS
Qty: 500 EACH | Refills: 3 | Status: SHIPPED | OUTPATIENT
Start: 2019-12-12 | End: 2020-11-24 | Stop reason: SDUPTHER

## 2019-12-12 NOTE — PROGRESS NOTES
Refill Authorization Note     is requesting a refill authorization.    Brief assessment and rationale for refill: APPROVE: prr  Name and strength of medication: CONTOUR NEXT TEST STRIPS Strp // lancets        Medication Therapy Plan: will pend lancets to promote greater pt adherence     Medication reconciliation completed: No              How patient will take medication: test 5x           Comments:   Diabetic Supplies Testing Frequency   5x/day    Last A1C: 6 months   Lab Results   Component Value Date    HGBA1C 9.9 (H) 10/14/2019    HGBA1C 10.0 (H) 09/10/2018    HGBA1C 9.4 (H) 08/08/2017    No results found for: LABA1C     Digital Medicine Diabetes Data: values will display if enrolled   Last 6 Patient Entered Readings                                          Most Recent A1c:      There is no flowsheet data to display.        - History of Stroke               Appointments     Date Provider   Last Visit   10/21/2019 Gemma Boone MD   Next Visit   12/11/2019 Gemma Boone MD

## 2019-12-13 NOTE — PROGRESS NOTES
Refill Authorization Note     is requesting a refill authorization.    Brief assessment and rationale for refill: QUICK DC: rts(12/20)                                         Comments:   Last Prescribed Info:    Ordering Provider: Gemma Boone MD LISA #:  [id number] NPI:  [id number]    Authorizing Provider: Gemma Boone MD LISA #:  [id number] NPI:  [id number]    Ordering User:  Filiberto Walker, PharmD            Diagnosis Association: Type 1 diabetes mellitus without complication (E10.9)      Original Order:  blood sugar diagnostic (CONTOUR NEXT TEST STRIPS) Strp [[id number]]      Pharmacy:  [facility]  [place] - [address] LISA #:  --     Pharmacy Comments:  --          Fill quantity remaining:  -- Fill quantity used:  -- Next fill due: --       Outpatient Medication Detail      Disp Refills Start End    blood sugar diagnostic (CONTOUR NEXT TEST STRIPS) Strp 500 strip 3 12/12/2019     Sig: To check BG 5 (five) times daily, to use with insurance preferred    Sent to pharmacy as: blood sugar diagnostic (CONTOUR NEXT TEST STRIPS) Strp    E-Prescribing Status: Receipt confirmed by pharmacy (12/12/2019 10:08 AM CST)         Appointments  past 15m or future 3m with PCP    Date Provider   Last Visit   10/21/2019 Gemma Boone MD   Next Visit   Visit date not found Gemma Boone MD

## 2020-01-03 DIAGNOSIS — Z12.11 COLON CANCER SCREENING: ICD-10-CM

## 2020-01-15 ENCOUNTER — PATIENT OUTREACH (OUTPATIENT)
Dept: ADMINISTRATIVE | Facility: OTHER | Age: 70
End: 2020-01-15

## 2020-04-27 RX ORDER — KETOCONAZOLE 20 MG/ML
SHAMPOO, SUSPENSION TOPICAL
Qty: 240 ML | Refills: 3 | Status: SHIPPED | OUTPATIENT
Start: 2020-04-27

## 2020-04-27 NOTE — PROGRESS NOTES
Refill Routing Note    Medication(s) are not appropriate for processing by [facility]:       Outside of protocol        Medication reconciliation completed: No      Appointments [id number] or future 3m with PCP    Date Provider   Last Visit   10/21/2019 Gemma Boone MD   Next Visit   Visit date not found Gemma Boone MD     Automatic Epic Protocol Generated Data:    Requested Prescriptions   Pending Prescriptions Disp Refills    ketoconazole (NIZORAL) 2 % shampoo [Pharmacy Med Name: ketoconazole 2 % shampoo] 240 mL 3     Sig: Apply topically twice a week.       Off-Protocol Failed - 4/27/2020 12:50 PM        Failed - Medication not assigned to a protocol, review manually.        Passed - Office visit in past 12 months or future 90 days.     Recent Outpatient Visits            6 months ago Need for pneumococcal vaccination    [facility] - Internal Medicine Gemma Boone MD    1 year ago Routine general medical examination at a health care facility    [facility] Gemma Boone MD    1 year ago Dysphagia, unspecified type    [facility] - Neurology Ale Adams NP    2 years ago Routine general medical examination at a health care facility    [facility] Medicine Gemma Boone MD    3 years ago Type 1 diabetes mellitus without complication    [facility] Theresa Dubose, ISAEL                       Note composed:6:47 PM 04/27/2020

## 2020-07-07 DIAGNOSIS — E10.8 TYPE 1 DIABETES MELLITUS WITH COMPLICATION: Primary | ICD-10-CM

## 2020-07-08 RX ORDER — PEN NEEDLE, DIABETIC 30 GX3/16"
NEEDLE, DISPOSABLE MISCELLANEOUS
Qty: 400 EACH | Refills: 3 | Status: SHIPPED | OUTPATIENT
Start: 2020-07-08 | End: 2021-08-06

## 2020-08-20 DIAGNOSIS — E11.9 TYPE 2 DIABETES MELLITUS WITHOUT COMPLICATION: ICD-10-CM

## 2020-09-09 DIAGNOSIS — I10 HYPERTENSION, UNSPECIFIED TYPE: ICD-10-CM

## 2020-09-09 DIAGNOSIS — Z00.00 ROUTINE GENERAL MEDICAL EXAMINATION AT A HEALTH CARE FACILITY: ICD-10-CM

## 2020-09-09 DIAGNOSIS — E10.8 TYPE 1 DIABETES MELLITUS WITH COMPLICATION: Primary | ICD-10-CM

## 2020-09-09 NOTE — TELEPHONE ENCOUNTER
Care Due:                  Date            Visit Type   Department     Provider  --------------------------------------------------------------------------------                                PHYSICAL -                              ESTABLISHED   [facility] INTERNAL  Last Visit: 10-      PATIENT      MEDICINE       [facility]  Next Visit: None Scheduled  None         None Found                                                            Last  Test          Frequency    Reason                     Performed    Due Date  --------------------------------------------------------------------------------    Office Visit  12 months..  FLUoxetine, LANTUS,        10-   10-                             amLODIPine, enalapril,                             insulin, omeprazole,                             rosuvastatin.............    ALT.........  12 months..  rosuvastatin.............  10-   10-    AST.........  12 months..  rosuvastatin.............  10-   10-    Cr..........  12 months..  LANTUS, enalapril,         10-   10-                             insulin..................    HBA1C.......  6 months...  LANTUS, insulin..........  10-   04-    HDL.........  12 months..  rosuvastatin.............  10-   10-    K...........  12 months..  enalapril................  10-   10-    LDL.........  12 months..  rosuvastatin.............  10-   10-    Total         12 months..  rosuvastatin.............  10-   10-  Cholesterol.    Triglyceride  12 months..  rosuvastatin.............  10-   10-  s...........    Powered by [employer]. Reference number: [id number]. 9/09/2020 9:40:30 AM CDT

## 2020-09-11 RX ORDER — FLUOXETINE HYDROCHLORIDE 20 MG/1
20 CAPSULE ORAL DAILY
Qty: 90 CAPSULE | Refills: 0 | Status: SHIPPED | OUTPATIENT
Start: 2020-09-11 | End: 2020-10-08

## 2020-09-11 RX ORDER — OMEPRAZOLE 20 MG/1
20 CAPSULE, DELAYED RELEASE ORAL DAILY
Qty: 90 CAPSULE | Refills: 0 | Status: SHIPPED | OUTPATIENT
Start: 2020-09-11 | End: 2020-10-08

## 2020-09-11 RX ORDER — DIVALPROEX SODIUM 250 MG/1
250 TABLET, FILM COATED, EXTENDED RELEASE ORAL DAILY
Qty: 30 TABLET | Refills: 11 | Status: SHIPPED | OUTPATIENT
Start: 2020-09-11 | End: 2020-10-08

## 2020-09-11 NOTE — PROGRESS NOTES
Refill Authorization Note    is requesting a refill authorization.    Brief assessment and rationale for refill: ROUTE: op(divalproex) // DEFER: DDI(PROZAC) // APPROVE: need labs     Medication-related problems identified:   Requires appointment  Requires labs    Medication Therapy Plan: CDMR. need appt(ANNUAL); NTBO(CMP, LIPID, A1C); approve omeprazole // DDI-Your patient has a diagnosis of Bipolar disorder, which is related to bipolar disorder. Patients with bipolar disorder should be carefully evaluated before initiating therapy and monitored closely while taking FLUOXETINE.;  ROUTE divalproex    Medication reconciliation completed: No                    Comments:      Orders Placed This Encounter    omeprazole (PRILOSEC) 20 MG capsule      Requested Prescriptions   Pending Prescriptions Disp Refills    FLUoxetine 20 MG capsule [Pharmacy Med Name: fluoxetine 20 mg capsule] 90 capsule 0     Sig: Take 1 capsule (20 mg total) by mouth once daily.       Psychiatry:  Antidepressants - SSRI Failed - 9/11/2020  5:11 PM        Failed - Office visit in past 6 months or future 90 days.     Recent Outpatient Visits            10 months ago Need for pneumococcal vaccination    [facility] Gemma Boone MD    1 year ago Routine general medical examination at a health care facility    [facility] Gemma Boone MD    2 years ago Dysphagia, unspecified type    [facility] - Neurology 7th Fl Ale Adams NP    3 years ago Routine general medical examination at a health care facility    [facility] Gemma Boone MD    4 years ago Type 1 diabetes mellitus without complication    [facility] Theresa Dubose, OD                    Passed - Patient is at least 18 years old          divalproex ER (DEPAKOTE ER) 250 MG 24 hr tablet [Pharmacy Med Name: divalproex  mg tablet,extended release 24 hr] 30 tablet 11      Sig: Take 1 tablet (250 mg total) by mouth once daily.       Anticonvulsants Protocol Failed - 9/9/2020  9:40 AM        Failed - Visit with Authorizing provider in past 9 months or upcoming 90 days         Signed Prescriptions Disp Refills    omeprazole (PRILOSEC) 20 MG capsule 90 capsule 0     Sig: Take 1 capsule (20 mg total) by mouth once daily.       Gastroenterology: Proton Pump Inhibitors Failed - 9/9/2020  9:40 AM        Failed - Office visit in past 6 months or future 90 days.     Recent Outpatient Visits            10 months ago Need for pneumococcal vaccination    [facility] Gemma Boone MD    1 year ago Routine general medical examination at a health care facility    [facility] Gemma Boone MD    2 years ago Dysphagia, unspecified type    [facility] - Neurology 7th Fl Ale Adams NP    3 years ago Routine general medical examination at a health care facility    [facility] Gmema Boone MD    4 years ago Type 1 diabetes mellitus without complication    [facility] Theresa Dubose, ISAEL                    Passed - Patient is at least 18 years old        Passed - Osteoporosis is not on problem list        Passed - Plavix is not on active medication list        Passed - GERD is on problem list            Appointments  past 12m or future 3m with PCP    Date Provider   Last Visit   10/21/2019 Gemma Boone MD   Next Visit   Visit date not found Gemma Boone MD   ED visits in past 90 days: 0     Note composed:5:19 PM 09/11/2020

## 2020-09-14 NOTE — TELEPHONE ENCOUNTER
Provider Staff:     Please schedule patient for Annual and Labs (CMP, LIPID, A1C)    Please also check with your provider if any further labs need to be added and scheduled together.    Thanks!  [facility]     Appointments  past 12m or future 3m with PCP    Date Provider   Last Visit   10/21/2019 Gemma Boone MD   Next Visit   Visit date not found Gemma Boone MD     Note composed:3:50 PM 09/14/2020

## 2020-09-29 ENCOUNTER — IMMUNIZATION (OUTPATIENT)
Dept: PHARMACY | Facility: CLINIC | Age: 70
End: 2020-09-29
Payer: COMMERCIAL

## 2020-09-29 ENCOUNTER — OFFICE VISIT (OUTPATIENT)
Dept: INTERNAL MEDICINE | Facility: CLINIC | Age: 70
End: 2020-09-29
Payer: COMMERCIAL

## 2020-09-29 ENCOUNTER — IMMUNIZATION (OUTPATIENT)
Dept: PHARMACY | Facility: CLINIC | Age: 70
End: 2020-09-29

## 2020-09-29 ENCOUNTER — PATIENT MESSAGE (OUTPATIENT)
Dept: PHARMACY | Facility: CLINIC | Age: 70
End: 2020-09-29

## 2020-09-29 VITALS
HEART RATE: 63 BPM | HEIGHT: 72 IN | OXYGEN SATURATION: 98 % | WEIGHT: 179.56 LBS | DIASTOLIC BLOOD PRESSURE: 60 MMHG | BODY MASS INDEX: 24.32 KG/M2 | SYSTOLIC BLOOD PRESSURE: 110 MMHG

## 2020-09-29 DIAGNOSIS — Z00.00 ROUTINE GENERAL MEDICAL EXAMINATION AT A HEALTH CARE FACILITY: Primary | ICD-10-CM

## 2020-09-29 PROCEDURE — 3074F PR MOST RECENT SYSTOLIC BLOOD PRESSURE < 130 MM HG: ICD-10-PCS | Mod: CPTII,S$GLB,, | Performed by: INTERNAL MEDICINE

## 2020-09-29 PROCEDURE — 99999 PR PBB SHADOW E&M-EST. PATIENT-LVL III: ICD-10-PCS | Mod: PBBFAC,,, | Performed by: INTERNAL MEDICINE

## 2020-09-29 PROCEDURE — 99397 PR PREVENTIVE VISIT,EST,65 & OVER: ICD-10-PCS | Mod: S$GLB,,, | Performed by: INTERNAL MEDICINE

## 2020-09-29 PROCEDURE — 3074F SYST BP LT 130 MM HG: CPT | Mod: CPTII,S$GLB,, | Performed by: INTERNAL MEDICINE

## 2020-09-29 PROCEDURE — 3078F PR MOST RECENT DIASTOLIC BLOOD PRESSURE < 80 MM HG: ICD-10-PCS | Mod: CPTII,S$GLB,, | Performed by: INTERNAL MEDICINE

## 2020-09-29 PROCEDURE — 3078F DIAST BP <80 MM HG: CPT | Mod: CPTII,S$GLB,, | Performed by: INTERNAL MEDICINE

## 2020-09-29 PROCEDURE — 99999 PR PBB SHADOW E&M-EST. PATIENT-LVL III: CPT | Mod: PBBFAC,,, | Performed by: INTERNAL MEDICINE

## 2020-09-29 PROCEDURE — 99397 PER PM REEVAL EST PAT 65+ YR: CPT | Mod: S$GLB,,, | Performed by: INTERNAL MEDICINE

## 2020-09-29 NOTE — PROGRESS NOTES
CHIEF COMPLAINT: Annual exam    HISTORY OF PRESENT ILLNESS: 70-year-old man presents for an annual exam.      He has been doing well. He lives with his Costa Ledesma ([phone]), who helps him with his medications and meals. Most of the history is obtained from Costa due to his dementia. He is happy living with Costa. He is eating well.  HE has gained 8 pounds in the last year.      HE is now taking ashwagandha (orgainic ashwagandha 380 mg plus ashwagandha extract 95 mg) one capsule once daily and Holy Basil Leaf (holy basil leaf extract 292 mg and holy basil leaf supercritical extract 190 mg) one capsule once daily.  HE is also taking Melatonin 10 mg 2-3 times daily which helps calm him. He is sleeping well. He has less movement of arms, legs, tongue.  he is sitting still and can hold a short conversation.   .  Mood is better on the supplements. HE is calmer.  Disposition is ok.  Memory is about 5 minutes He rarely gets irritable. Anxiety is better. Swallowing is better. No chocking.  He no longer worries about things.   HE has a sense of humor.  He saw neurology 5/21/18 who recommened Zyprexa 2.5 mg twice daily which made things worse. HE was hearing voices so Costa stopped Zyprexa medication. He is forgetting to drink his water.       He is currently taking Lantus 20 units in the evening and Novolog 5-10  units plus sliding scale before meals. Blood sugars have been higher.  Costa gives him extra Lanuts twice a week 8-10 units extra.. No fever, chills, nausea, voimiting, constipation, diarrhea. No significant hypoglycemia     No dysuria, hematuria, nocturia, straining or incomplete void, chest pain, shortness of breath, rhinorrhea, headache, blurred vision, hearing loss. He continues to take Crestor 10 mg daily for his hyperlipidemia. He denies any joint pain, muscle pain. He continues to take enalapril 20 mg twice daily and Norvasc 10 mg daily for his hypertension. He is on omeprazole 20 mg once daily for  reflux and has not had any reflux symptoms.     HE is currently taking prozac 20 mg daily, depakote 250 mg in am and 500 mg in the evening and cogentin 0.5 mg twice daily for his mood and seizure disorder. He has not had any seizures. Mood is good. HE gets irritable at times and is redirectable.   He is not paranoid.  He sleeps well.  No depression.         REVIEW OF SYSTEMS: She denies fevers, chills, night sweats, fatigue, visual change, hearing loss, sinus congestion, sore throat, chest pain, shortness of breath, nausea, vomiting, constipation, diarrhea, dysuria, hematuria, polydipsia, polyuria, joint pain, muscle pain, headaches.        PAST MEDICAL HISTORY:   1. Vascular dementia.   2. Type I diabetes.   3. Seizure disorder.   4. History of polysubstance abuse.   5. Recovered alcoholic   6. Bipolar disorder.   7. History of renal insufficiency.   8. Hepatitis C.   9. History of left a left upper extremity DVT January 2007 after an IV.   10. First cervical spine arthritis on C6-C7 on x-ray.   11. Hospitalization July 2008 due to ingestion of caustic substance, which caused erosive esophagitis.    12. History of her aspiration during hospitalization 7/2008.     MEDICATIONS and ALLERGIES: Updated on epic.     PHYSICAL EXAMINATION:   /60   Pulse 63   Ht 6' (1.829 m)   Wt 81.4 kg (179 lb 9 oz)   SpO2 98%   BMI 24.35 kg/m²     General: Alert, oriented. No apparent distress. Affect within normal limits.   Conjunctivae anicteric. Tympanic membranes clear. Oropharynx clear. Poor dentition  Neck supple.   Respiratory effort normal. Lungs clear bilaterally. Good air flow  Heart: Regular rate and rhythm without murmurs, gallops or rubs.   No lower extremity edema.   Abdomen: Soft, nondistended, nontender. Bowel sounds present. No   hepatosplenomegaly.    Constant movement of arms, legs, lips and tongue - not as bad. He could stay in the chair     Dandruff on scalp     Protective Sensation (w/ 10 gram  monofilament):  Right: Decreased  Left: Decreased    Visual Inspection:  Onychomycosis -  Bilateral    Pedal Pulses:   Right: Present  Left: Present    Posterior tibialis:   Right:Present  Left: Present     labs pending     ASSESSMENT AND PLAN:    Annual exam  1. Type I diabetes labs  2. Hypertension - controlled.   3. Hyperlipidemia - controlled  4. Hepatitis C -saw hepatology 10/2018. Hepatitis C RNA negative.   5. Bipolar disorder - mood better    7. COPD - has quit  9. Screening - . Pt declines colonscopy.  10. Movement disorder - better with supplements  11. Seborrehic dermatitis - ketoconazole shampoo  Cannot do colonoscopy prep  I'll see him back in 3 months, sooner if problems arise  Caregiver does not want him to come in 3 months but 6 months  Pneumovax 2015   and flu today

## 2020-10-05 ENCOUNTER — PATIENT MESSAGE (OUTPATIENT)
Dept: ADMINISTRATIVE | Facility: HOSPITAL | Age: 70
End: 2020-10-05

## 2020-10-05 ENCOUNTER — TELEPHONE (OUTPATIENT)
Dept: INTERNAL MEDICINE | Facility: CLINIC | Age: 70
End: 2020-10-05

## 2020-10-05 NOTE — TELEPHONE ENCOUNTER
Your kidney function, liver function, cholesterol, are fine  Hemoglobin A1C is 9.8 or an average of 235 - where you normally run.

## 2020-10-05 NOTE — TELEPHONE ENCOUNTER
----- Message from Anni Hale sent at 10/5/2020 11:30 AM CDT -----  Contact: self [phone]  Calling to get test results.  Name of test (lab, xray, etc.):   labs  Date of test:  9/29  Ordering provider: dr green  Where was the test performed:  [facility]  Would the patient rather a call back or a response via [facility]?:  call back   Comments:

## 2020-10-07 NOTE — TELEPHONE ENCOUNTER
No new care gaps identified.  Powered by [employer]. Reference number: [id number]. 10/07/2020 5:07:05 PM   CDT

## 2020-10-08 RX ORDER — ENALAPRIL MALEATE 20 MG/1
20 TABLET ORAL 2 TIMES DAILY
Qty: 90 TABLET | Refills: 3 | Status: SHIPPED | OUTPATIENT
Start: 2020-10-08 | End: 2021-05-05

## 2020-10-08 RX ORDER — BENZTROPINE MESYLATE 0.5 MG/1
0.5 TABLET ORAL 2 TIMES DAILY
Qty: 60 TABLET | Refills: 11 | Status: SHIPPED | OUTPATIENT
Start: 2020-10-08 | End: 2021-10-12

## 2020-10-08 RX ORDER — ROSUVASTATIN CALCIUM 10 MG/1
10 TABLET, COATED ORAL NIGHTLY
Qty: 90 TABLET | Refills: 3 | Status: SHIPPED | OUTPATIENT
Start: 2020-10-08 | End: 2021-10-12

## 2020-10-08 RX ORDER — OMEPRAZOLE 20 MG/1
CAPSULE, DELAYED RELEASE ORAL
Qty: 90 CAPSULE | Refills: 3 | Status: SHIPPED | OUTPATIENT
Start: 2020-10-08 | End: 2021-11-29 | Stop reason: SDUPTHER

## 2020-10-08 RX ORDER — FLUOXETINE HYDROCHLORIDE 20 MG/1
CAPSULE ORAL
Qty: 90 CAPSULE | Refills: 3 | Status: SHIPPED | OUTPATIENT
Start: 2020-10-08 | End: 2021-09-24

## 2020-10-08 RX ORDER — DIVALPROEX SODIUM 250 MG/1
250 TABLET, FILM COATED, EXTENDED RELEASE ORAL DAILY
Qty: 30 TABLET | Refills: 11 | Status: SHIPPED | OUTPATIENT
Start: 2020-10-08 | End: 2021-10-12

## 2020-10-08 NOTE — PROGRESS NOTES
Refill Authorization Note   Layo Haney is requesting a refill authorization.  Brief assessment and rationale for refill:   Route  Approve     Medication Therapy Plan: CDMR. ROUTE op(benztropine, divalproex) // approve other meds     Medication reconciliation completed: No   Comments:   Orders Placed This Encounter    rosuvastatin (CRESTOR) 10 MG tablet    enalapril (VASOTEC) 20 MG tablet    FLUoxetine 20 MG capsule    omeprazole (PRILOSEC) 20 MG capsule      Requested Prescriptions   Pending Prescriptions Disp Refills    divalproex ER (DEPAKOTE ER) 250 MG 24 hr tablet [Pharmacy Med Name: divalproex  mg tablet,extended release 24 hr] 30 tablet 0     Sig: Take 1 tablet (250 mg total) by mouth once daily.       Anticonvulsants Protocol Passed - 10/8/2020  4:40 PM        Passed - Visit with Authorizing provider in past 9 months or upcoming 90 days          benztropine (COGENTIN) 0.5 MG tablet [Pharmacy Med Name: benztropine 0.5 mg tablet] 60 tablet 11     Sig: Take 1 tablet (0.5 mg total) by mouth 2 (two) times daily.       There is no refill protocol information for this order      Signed Prescriptions Disp Refills    rosuvastatin (CRESTOR) 10 MG tablet 90 tablet 3     Sig: Take 1 tablet (10 mg total) by mouth every evening.       Cardiovascular:  Antilipid - Statins Passed - 10/7/2020  5:08 PM        Passed - Patient is at least 18 years old        Passed - Office Visit within last 12 months or future 90 days.     Recent Outpatient Visits            1 week ago Routine general medical examination at a health care facility    [facility] Gemma Boone MD    11 months ago Need for pneumococcal vaccination    [facility] Gemma Boone MD    2 years ago Routine general medical examination at a health care facility    [facility] Gemma Boone MD    2 years ago Dysphagia, unspecified type    [facility] - Neurology 7th [facility]  LALITO Adams    3 years ago Routine general medical examination at a health care facility    [facility] Gemma Boone MD          Future Appointments              In 3 weeks Rodrigo Aldrich OD [place] - Opt[place], [place]                Passed - ALT is 94 or below and within 360 days     ALT   Date Value Ref Range Status   09/29/2020 18 10 - 44 U/L Final   10/14/2019 10 10 - 44 U/L Final   09/10/2018 25 10 - 44 U/L Final              Passed - AST is 54 or below and within 360 days     AST   Date Value Ref Range Status   09/29/2020 16 10 - 40 U/L Final   10/14/2019 11 10 - 40 U/L Final   09/10/2018 18 10 - 40 U/L Final              Passed - Total Cholesterol within 360 days     Cholesterol   Date Value Ref Range Status   09/29/2020 158 120 - 199 mg/dL Final     Comment:     The National Cholesterol Education Program (NCEP) has set the  following guidelines (reference ranges) for Cholesterol:  Optimal.....................<200 mg/dL  Borderline High.............200-239 mg/dL  High........................> or = 240 mg/dL     10/14/2019 137 120 - 199 mg/dL Final     Comment:     The National Cholesterol Education Program (NCEP) has set the  following guidelines (reference ranges) for Cholesterol:  Optimal.....................<200 mg/dL  Borderline High.............200-239 mg/dL  High........................> or = 240 mg/dL     09/10/2018 158 120 - 199 mg/dL Final     Comment:     The National Cholesterol Education Program (NCEP) has set the  following guidelines (reference ranges) for Cholesterol:  Optimal.....................<200 mg/dL  Borderline High.............200-239 mg/dL  High........................> or = 240 mg/dL                Passed - LDL within 360 days     LDL Cholesterol   Date Value Ref Range Status   09/29/2020 81.0 63.0 - 159.0 mg/dL Final     Comment:     The National Cholesterol Education Program (NCEP) has set the  following guidelines (reference values) for LDL  Cholesterol:  Optimal.......................<130 mg/dL  Borderline High...............130-159 mg/dL  High..........................160-189 mg/dL  Very High.....................>190 mg/dL              Passed - HDL within 360 days     HDL   Date Value Ref Range Status   09/29/2020 60 40 - 75 mg/dL Final     Comment:     The National Cholesterol Education Program (NCEP) has set the  following guidelines (reference values) for HDL Cholesterol:  Low...............<40 mg/dL  Optimal...........>60 mg/dL              Passed - Triglycerides within 360 days     Triglycerides   Date Value Ref Range Status   09/29/2020 85 30 - 150 mg/dL Final     Comment:     The National Cholesterol Education Program (NCEP) has set the  following guidelines (reference values) for triglycerides:  Normal......................<150 mg/dL  Borderline High.............150-199 mg/dL  High........................200-499 mg/dL     10/14/2019 79 30 - 150 mg/dL Final     Comment:     The National Cholesterol Education Program (NCEP) has set the  following guidelines (reference values) for triglycerides:  Normal......................<150 mg/dL  Borderline High.............150-199 mg/dL  High........................200-499 mg/dL     09/10/2018 65 30 - 150 mg/dL Final     Comment:     The National Cholesterol Education Program (NCEP) has set the  following guidelines (reference values) for triglycerides:  Normal......................<150 mg/dL  Borderline High.............150-199 mg/dL  High........................200-499 mg/dL                  enalapril (VASOTEC) 20 MG tablet 90 tablet 3     Sig: Take 1 tablet (20 mg total) by mouth 2 (two) times daily.       Cardiovascular:  ACE Inhibitors Passed - 10/7/2020  5:08 PM        Passed - Patient is at least 18 years old        Passed - Last BP in normal range within 360 days     BP Readings from Last 3 Encounters:   09/29/20 110/60   10/21/19 130/74   04/12/19 (!) 178/92              Passed - Office Visit  within last 12 months or future 90 days.     Recent Outpatient Visits            1 week ago Routine general medical examination at a health care facility    [facility] Gemma Boone MD    11 months ago Need for pneumococcal vaccination    [facility] Gemma Boone MD    2 years ago Routine general medical examination at a health care facility    [facility] Gemma Boone MD    2 years ago Dysphagia, unspecified type    [facility] - Neurology 7th Fl Ale Adams NP    3 years ago Routine general medical examination at a health care facility    [facility] Gemma Boone MD          Future Appointments              In 3 weeks Rodrigo Aldrich, ISAEL [place] - Optometry, [place]                Passed - Cr is 1.3 or below and within 360 days     Creatinine   Date Value Ref Range Status   09/29/2020 0.9 0.5 - 1.4 mg/dL Final   10/14/2019 0.8 0.5 - 1.4 mg/dL Final   09/10/2018 0.8 0.5 - 1.4 mg/dL Final              Passed - K in normal range and within 360 days     Potassium   Date Value Ref Range Status   09/29/2020 5.0 3.5 - 5.1 mmol/L Final   10/14/2019 4.3 3.5 - 5.1 mmol/L Final   09/10/2018 4.6 3.5 - 5.1 mmol/L Final              Passed - eGFR within 360 days     eGFR if non    Date Value Ref Range Status   09/29/2020 >60.0 >60 mL/min/1.73 m^2 Final     Comment:     Calculation used to obtain the estimated glomerular filtration  rate (eGFR) is the CKD-EPI equation.      10/14/2019 >60.0 >60 mL/min/1.73 m^2 Final     Comment:     Calculation used to obtain the estimated glomerular filtration  rate (eGFR) is the CKD-EPI equation.      09/10/2018 >60.0 >60 mL/min/1.73 m^2 Final     Comment:     Calculation used to obtain the estimated glomerular filtration  rate (eGFR) is the CKD-EPI equation.        eGFR if    Date Value Ref Range Status   09/29/2020 >60.0 >60 mL/min/1.73 m^2  Final   10/14/2019 >60.0 >60 mL/min/1.73 m^2 Final   09/10/2018 >60.0 >60 mL/min/1.73 m^2 Final                FLUoxetine 20 MG capsule 90 capsule 3     Sig: TAKE 1 CAPSULE BY MOUTH DAILY       Psychiatry:  Antidepressants - SSRI Passed - 10/8/2020  4:36 PM        Passed - Patient is at least 18 years old        Passed - Office Visit within last 12 months or future 90 days.     Recent Outpatient Visits            1 week ago Routine general medical examination at a health care facility    [facility] Gemma Boone MD    11 months ago Need for pneumococcal vaccination    [facility] Gemma Boone MD    2 years ago Routine general medical examination at a health care facility    [facility] Gemma Boone MD    2 years ago Dysphagia, unspecified type    [facility] Neurology 7th Fl Ale Adams NP    3 years ago Routine general medical examination at a health care facility    [facility] Gemma Boone MD          Future Appointments              In 3 weeks Rodrigo Aldrich, OD [place] - Optometry, [place]                  omeprazole (PRILOSEC) 20 MG capsule 90 capsule 3     Sig: TAKE 1 CAPSULE BY MOUTH DAILY       Gastroenterology: Proton Pump Inhibitors Passed - 10/8/2020  4:36 PM        Passed - Patient is at least 18 years old        Passed - Osteoporosis is not on problem list        Passed - Plavix is not on active medication list        Passed - Office Visit within last 12 months or future 90 days.     Recent Outpatient Visits            1 week ago Routine general medical examination at a health care facility    [facility] Gemma Boone MD    11 months ago Need for pneumococcal vaccination    [facility] Gemma Boone MD    2 years ago Routine general medical examination at a health care facility    [facility] Gemma HUMPHREY  MD [place]    2 years ago Dysphagia, unspecified type    [facility] 7th Fl Ale Adams NP    3 years ago Routine general medical examination at a health care facility    [facility] Gemma Boone MD          Future Appointments              In 3 weeks Rodrigo Aldrich, OD [place] - Optometry, [place]                Passed - GERD is on problem list            Appointments  past 12m or future 3m with PCP    Date Provider   Last Visit   9/29/2020 Gemma Boone MD   Next Visit   Visit date not found Gemma Boone MD   ED visits in past 90 days: 0     Note composed:4:42 PM 10/08/2020

## 2020-10-15 ENCOUNTER — TELEPHONE (OUTPATIENT)
Dept: INTERNAL MEDICINE | Facility: CLINIC | Age: 70
End: 2020-10-15

## 2020-10-20 RX ORDER — AMLODIPINE BESYLATE 10 MG/1
10 TABLET ORAL DAILY
Qty: 90 TABLET | Refills: 3 | Status: SHIPPED | OUTPATIENT
Start: 2020-10-20 | End: 2021-11-04

## 2020-10-20 NOTE — TELEPHONE ENCOUNTER
No new care gaps identified.  Powered by bettercodes.org. Reference number: [id number]. 10/20/2020 9:25:27 AM   JOHNT

## 2020-10-20 NOTE — PROGRESS NOTES
Refill Authorization Note   Layo Haney is requesting a refill authorization.  Brief assessment and rationale for refill: Approve     Medication Therapy Plan: [facility]    Medication reconciliation completed: No   Comments:   Orders Placed This Encounter    amLODIPine (NORVASC) 10 MG tablet      Requested Prescriptions   Signed Prescriptions Disp Refills    amLODIPine (NORVASC) 10 MG tablet 90 tablet 3     Sig: Take 1 tablet (10 mg total) by mouth once daily.       Cardiovascular:  Calcium Channel Blockers Passed - 10/20/2020  9:25 AM        Passed - Patient is at least 18 years old        Passed - Last BP in normal range within 360 days     BP Readings from Last 3 Encounters:   09/29/20 110/60   10/21/19 130/74   04/12/19 (!) 178/92              Passed - Office Visit within last 12 months or future 90 days.     Recent Outpatient Visits            3 weeks ago Routine general medical examination at a health care facility    [facility] toni Boone MD    1 year ago Need for pneumococcal vaccination    [facility] Gemma Boone MD    2 years ago Routine general medical examination at a health care facility    [facility] Gemma Boone MD    2 years ago Dysphagia, unspecified type    [facility] - Neurology 7th Fl Ale Adams NP    3 years ago Routine general medical examination at a health care facility    [facility] Gemma Boone MD          Future Appointments              In 2 weeks Rodrigo Aldrich OD [facility] - Optometry 1st Fl, [place]                    Appointments  past 12m or future 3m with PCP    Date Provider   Last Visit   9/29/2020 Gemma Boone MD   Next Visit   Visit date not found Gemma Boone MD   ED visits in past 90 days: 0     Note composed:4:19 PM 10/20/2020

## 2020-11-02 ENCOUNTER — PATIENT OUTREACH (OUTPATIENT)
Dept: ADMINISTRATIVE | Facility: OTHER | Age: 70
End: 2020-11-02

## 2020-11-02 NOTE — PROGRESS NOTES
Care Everywhere: updated  Immunization:   Health Maintenance: updated  Media Review:   Legacy Review:   Order placed:   Upcoming appts: optometry 11/4

## 2020-11-05 ENCOUNTER — TELEPHONE (OUTPATIENT)
Dept: INTERNAL MEDICINE | Facility: CLINIC | Age: 70
End: 2020-11-05

## 2020-11-05 NOTE — TELEPHONE ENCOUNTER
----- Message from Stella Dhillon sent at 11/5/2020  3:16 PM CST -----  Contact: Costa Ledesma   Would like to get medical advice.  Symptoms (please be specific):  sores and infections on legs, hands and arms from scratching. Costa is also requesting wound care directions.  How long has patient had these symptoms:  on going  Pharmacy name and phone # (copy from chart):    [facility] - [place] [place] [address]  [address]  [place] [address]  Phone: [phone] Fax: [phone]   Comments:

## 2020-11-17 RX ORDER — DIVALPROEX SODIUM 500 MG/1
TABLET, FILM COATED, EXTENDED RELEASE ORAL
Qty: 30 TABLET | Refills: 11 | Status: SHIPPED | OUTPATIENT
Start: 2020-11-17 | End: 2021-11-04

## 2020-11-17 NOTE — PROGRESS NOTES
Refill Routing Note   Medication(s) are not appropriate for processing by [facility] for the following reason(s):     - Outside of Protocol  [facility] action(s):   Route          Medication reconciliation completed: No   Automatic Epic Generated Protocol Data:        Requested Prescriptions   Pending Prescriptions Disp Refills    divalproex ER (DEPAKOTE) 500 MG Tb24 [Pharmacy Med Name: divalproex  mg tablet,extended release 24 hr] 30 tablet 0     Sig: TAKE 1 TABLET BY MOUTH EVERY EVENING       Anticonvulsants Protocol Passed - 11/17/2020  4:10 PM        Passed - Visit with Authorizing provider in past 9 months or upcoming 90 days              Appointments  past 12m or future 3m with PCP    Date Provider   Last Visit   9/29/2020 Gemma Boone MD   Next Visit   Visit date not found Gemma Boone MD   ED visits in past 90 days: 0        Note composed:5:51 PM 11/17/2020

## 2021-01-06 DIAGNOSIS — Z12.11 COLON CANCER SCREENING: ICD-10-CM

## 2021-01-20 DIAGNOSIS — E11.9 TYPE 2 DIABETES MELLITUS WITHOUT COMPLICATION: ICD-10-CM

## 2021-02-09 ENCOUNTER — TELEPHONE (OUTPATIENT)
Dept: INTERNAL MEDICINE | Facility: CLINIC | Age: 71
End: 2021-02-09

## 2021-03-05 ENCOUNTER — PATIENT MESSAGE (OUTPATIENT)
Dept: OPTOMETRY | Facility: CLINIC | Age: 71
End: 2021-03-05

## 2021-04-05 ENCOUNTER — PATIENT MESSAGE (OUTPATIENT)
Dept: ADMINISTRATIVE | Facility: HOSPITAL | Age: 71
End: 2021-04-05

## 2021-05-05 DIAGNOSIS — E11.9 TYPE 2 DIABETES MELLITUS WITHOUT COMPLICATION: ICD-10-CM

## 2021-05-12 DIAGNOSIS — E11.9 TYPE 2 DIABETES MELLITUS WITHOUT COMPLICATION: ICD-10-CM

## 2021-05-28 RX ORDER — MUPIROCIN 20 MG/G
OINTMENT TOPICAL
Qty: 180 G | Refills: 3 | Status: SHIPPED | OUTPATIENT
Start: 2021-05-28

## 2021-05-28 RX ORDER — MUPIROCIN 20 MG/G
OINTMENT TOPICAL
Qty: 180 G | Refills: 3 | Status: SHIPPED | OUTPATIENT
Start: 2021-05-28 | End: 2022-06-07

## 2021-06-15 DIAGNOSIS — G25.5 OTHER CHOREA: ICD-10-CM

## 2021-06-15 RX ORDER — OLANZAPINE 2.5 MG/1
TABLET ORAL
Qty: 60 TABLET | Refills: 0 | Status: SHIPPED | OUTPATIENT
Start: 2021-06-15 | End: 2021-08-16

## 2021-07-07 ENCOUNTER — PATIENT MESSAGE (OUTPATIENT)
Dept: ADMINISTRATIVE | Facility: HOSPITAL | Age: 71
End: 2021-07-07

## 2021-07-08 ENCOUNTER — TELEPHONE (OUTPATIENT)
Dept: INTERNAL MEDICINE | Facility: CLINIC | Age: 71
End: 2021-07-08

## 2021-07-08 DIAGNOSIS — Z00.00 ROUTINE GENERAL MEDICAL EXAMINATION AT A HEALTH CARE FACILITY: Primary | ICD-10-CM

## 2021-08-03 ENCOUNTER — PATIENT MESSAGE (OUTPATIENT)
Dept: ADMINISTRATIVE | Facility: HOSPITAL | Age: 71
End: 2021-08-03

## 2021-08-05 DIAGNOSIS — E10.8 TYPE 1 DIABETES MELLITUS WITH COMPLICATION: ICD-10-CM

## 2021-08-06 RX ORDER — PEN NEEDLE, DIABETIC 30 GX3/16"
NEEDLE, DISPOSABLE MISCELLANEOUS
Qty: 400 EACH | Refills: 0 | Status: SHIPPED | OUTPATIENT
Start: 2021-08-06 | End: 2021-12-27

## 2021-08-16 DIAGNOSIS — G25.5 OTHER CHOREA: ICD-10-CM

## 2021-08-16 RX ORDER — OLANZAPINE 2.5 MG/1
TABLET ORAL
Qty: 60 TABLET | Refills: 6 | Status: SHIPPED | OUTPATIENT
Start: 2021-08-16 | End: 2021-11-29 | Stop reason: SDUPTHER

## 2021-08-25 DIAGNOSIS — Z12.11 COLON CANCER SCREENING: ICD-10-CM

## 2021-09-20 ENCOUNTER — TELEPHONE (OUTPATIENT)
Dept: INTERNAL MEDICINE | Facility: CLINIC | Age: 71
End: 2021-09-20

## 2021-09-24 RX ORDER — FLUOXETINE HYDROCHLORIDE 40 MG/1
40 CAPSULE ORAL DAILY
Qty: 30 CAPSULE | Refills: 1 | Status: SHIPPED | OUTPATIENT
Start: 2021-09-24 | End: 2021-11-29 | Stop reason: SDUPTHER

## 2021-10-12 RX ORDER — BENZTROPINE MESYLATE 0.5 MG/1
0.5 TABLET ORAL 2 TIMES DAILY
Qty: 60 TABLET | Refills: 0 | Status: SHIPPED | OUTPATIENT
Start: 2021-10-12 | End: 2021-11-07

## 2021-10-12 RX ORDER — ROSUVASTATIN CALCIUM 10 MG/1
TABLET, COATED ORAL
Qty: 90 TABLET | Refills: 0 | Status: SHIPPED | OUTPATIENT
Start: 2021-10-12 | End: 2021-11-29 | Stop reason: SDUPTHER

## 2021-10-12 RX ORDER — DIVALPROEX SODIUM 250 MG/1
TABLET, FILM COATED, EXTENDED RELEASE ORAL
Qty: 30 TABLET | Refills: 0 | Status: SHIPPED | OUTPATIENT
Start: 2021-10-12 | End: 2021-11-29 | Stop reason: SDUPTHER

## 2021-10-18 ENCOUNTER — PATIENT MESSAGE (OUTPATIENT)
Dept: ADMINISTRATIVE | Facility: HOSPITAL | Age: 71
End: 2021-10-18
Payer: COMMERCIAL

## 2021-11-04 RX ORDER — AMLODIPINE BESYLATE 10 MG/1
10 TABLET ORAL DAILY
Qty: 90 TABLET | Refills: 0 | Status: SHIPPED | OUTPATIENT
Start: 2021-11-04 | End: 2021-11-29 | Stop reason: SDUPTHER

## 2021-11-04 RX ORDER — DIVALPROEX SODIUM 500 MG/1
TABLET, FILM COATED, EXTENDED RELEASE ORAL
Qty: 30 TABLET | Refills: 0 | Status: SHIPPED | OUTPATIENT
Start: 2021-11-04 | End: 2021-11-29

## 2021-11-07 RX ORDER — BENZTROPINE MESYLATE 0.5 MG/1
0.5 TABLET ORAL 2 TIMES DAILY
Qty: 60 TABLET | Refills: 0 | Status: SHIPPED | OUTPATIENT
Start: 2021-11-07 | End: 2021-11-29 | Stop reason: SDUPTHER

## 2021-11-29 ENCOUNTER — IMMUNIZATION (OUTPATIENT)
Dept: INTERNAL MEDICINE | Facility: CLINIC | Age: 71
End: 2021-11-29
Payer: COMMERCIAL

## 2021-11-29 ENCOUNTER — LAB VISIT (OUTPATIENT)
Dept: LAB | Facility: HOSPITAL | Age: 71
End: 2021-11-29
Attending: INTERNAL MEDICINE
Payer: COMMERCIAL

## 2021-11-29 ENCOUNTER — OFFICE VISIT (OUTPATIENT)
Dept: INTERNAL MEDICINE | Facility: CLINIC | Age: 71
End: 2021-11-29
Payer: COMMERCIAL

## 2021-11-29 VITALS — WEIGHT: 173.75 LBS | HEIGHT: 74 IN | OXYGEN SATURATION: 96 % | HEART RATE: 59 BPM | BODY MASS INDEX: 22.3 KG/M2

## 2021-11-29 DIAGNOSIS — Z00.00 ROUTINE GENERAL MEDICAL EXAMINATION AT A HEALTH CARE FACILITY: Primary | ICD-10-CM

## 2021-11-29 DIAGNOSIS — Z00.00 ROUTINE GENERAL MEDICAL EXAMINATION AT A HEALTH CARE FACILITY: ICD-10-CM

## 2021-11-29 DIAGNOSIS — I10 HYPERTENSION, UNSPECIFIED TYPE: ICD-10-CM

## 2021-11-29 DIAGNOSIS — E10.8 TYPE 1 DIABETES MELLITUS WITH COMPLICATION: ICD-10-CM

## 2021-11-29 DIAGNOSIS — Z12.11 SCREENING FOR MALIGNANT NEOPLASM OF COLON: ICD-10-CM

## 2021-11-29 DIAGNOSIS — G25.5 OTHER CHOREA: ICD-10-CM

## 2021-11-29 LAB
25(OH)D3+25(OH)D2 SERPL-MCNC: 56 NG/ML (ref 30–96)
ALBUMIN SERPL BCP-MCNC: 3.3 G/DL (ref 3.5–5.2)
ALP SERPL-CCNC: 133 U/L (ref 55–135)
ALT SERPL W/O P-5'-P-CCNC: 27 U/L (ref 10–44)
ANION GAP SERPL CALC-SCNC: 9 MMOL/L (ref 8–16)
AST SERPL-CCNC: 20 U/L (ref 10–40)
BASOPHILS # BLD AUTO: 0.04 K/UL (ref 0–0.2)
BASOPHILS NFR BLD: 0.8 % (ref 0–1.9)
BILIRUB SERPL-MCNC: 0.3 MG/DL (ref 0.1–1)
BUN SERPL-MCNC: 16 MG/DL (ref 8–23)
CALCIUM SERPL-MCNC: 9.1 MG/DL (ref 8.7–10.5)
CHLORIDE SERPL-SCNC: 102 MMOL/L (ref 95–110)
CHOLEST SERPL-MCNC: 127 MG/DL (ref 120–199)
CHOLEST/HDLC SERPL: 2.4 {RATIO} (ref 2–5)
CO2 SERPL-SCNC: 27 MMOL/L (ref 23–29)
COMPLEXED PSA SERPL-MCNC: 0.38 NG/ML (ref 0–4)
CREAT SERPL-MCNC: 0.8 MG/DL (ref 0.5–1.4)
DIFFERENTIAL METHOD: ABNORMAL
EOSINOPHIL # BLD AUTO: 0.3 K/UL (ref 0–0.5)
EOSINOPHIL NFR BLD: 5.1 % (ref 0–8)
ERYTHROCYTE [DISTWIDTH] IN BLOOD BY AUTOMATED COUNT: 13.7 % (ref 11.5–14.5)
EST. GFR  (AFRICAN AMERICAN): >60 ML/MIN/1.73 M^2
EST. GFR  (NON AFRICAN AMERICAN): >60 ML/MIN/1.73 M^2
ESTIMATED AVG GLUCOSE: 226 MG/DL (ref 68–131)
GLUCOSE SERPL-MCNC: 304 MG/DL (ref 70–110)
HBA1C MFR BLD: 9.5 % (ref 4–5.6)
HCT VFR BLD AUTO: 38 % (ref 40–54)
HDLC SERPL-MCNC: 53 MG/DL (ref 40–75)
HDLC SERPL: 41.7 % (ref 20–50)
HGB BLD-MCNC: 11.8 G/DL (ref 14–18)
IMM GRANULOCYTES # BLD AUTO: 0.01 K/UL (ref 0–0.04)
IMM GRANULOCYTES NFR BLD AUTO: 0.2 % (ref 0–0.5)
LDLC SERPL CALC-MCNC: 64.2 MG/DL (ref 63–159)
LYMPHOCYTES # BLD AUTO: 1.7 K/UL (ref 1–4.8)
LYMPHOCYTES NFR BLD: 33.1 % (ref 18–48)
MCH RBC QN AUTO: 28.8 PG (ref 27–31)
MCHC RBC AUTO-ENTMCNC: 31.1 G/DL (ref 32–36)
MCV RBC AUTO: 93 FL (ref 82–98)
MONOCYTES # BLD AUTO: 0.6 K/UL (ref 0.3–1)
MONOCYTES NFR BLD: 11.3 % (ref 4–15)
NEUTROPHILS # BLD AUTO: 2.5 K/UL (ref 1.8–7.7)
NEUTROPHILS NFR BLD: 49.5 % (ref 38–73)
NONHDLC SERPL-MCNC: 74 MG/DL
NRBC BLD-RTO: 0 /100 WBC
PLATELET # BLD AUTO: 254 K/UL (ref 150–450)
PMV BLD AUTO: 11.9 FL (ref 9.2–12.9)
POTASSIUM SERPL-SCNC: 4.4 MMOL/L (ref 3.5–5.1)
PROT SERPL-MCNC: 7.1 G/DL (ref 6–8.4)
RBC # BLD AUTO: 4.1 M/UL (ref 4.6–6.2)
SODIUM SERPL-SCNC: 138 MMOL/L (ref 136–145)
TRIGL SERPL-MCNC: 49 MG/DL (ref 30–150)
TSH SERPL DL<=0.005 MIU/L-ACNC: 2.25 UIU/ML (ref 0.4–4)
VIT B12 SERPL-MCNC: 908 PG/ML (ref 210–950)
WBC # BLD AUTO: 5.05 K/UL (ref 3.9–12.7)

## 2021-11-29 PROCEDURE — 90694 FLU VACCINE - QUADRIVALENT - ADJUVANTED: ICD-10-PCS | Mod: S$GLB,,, | Performed by: INTERNAL MEDICINE

## 2021-11-29 PROCEDURE — 80061 LIPID PANEL: CPT | Performed by: INTERNAL MEDICINE

## 2021-11-29 PROCEDURE — 99999 PR PBB SHADOW E&M-EST. PATIENT-LVL III: ICD-10-PCS | Mod: PBBFAC,,, | Performed by: INTERNAL MEDICINE

## 2021-11-29 PROCEDURE — 85025 COMPLETE CBC W/AUTO DIFF WBC: CPT | Performed by: INTERNAL MEDICINE

## 2021-11-29 PROCEDURE — 4010F PR ACE/ARB THEARPY RXD/TAKEN: ICD-10-PCS | Mod: CPTII,S$GLB,, | Performed by: INTERNAL MEDICINE

## 2021-11-29 PROCEDURE — 84443 ASSAY THYROID STIM HORMONE: CPT | Performed by: INTERNAL MEDICINE

## 2021-11-29 PROCEDURE — 90471 FLU VACCINE - QUADRIVALENT - ADJUVANTED: ICD-10-PCS | Mod: S$GLB,,, | Performed by: INTERNAL MEDICINE

## 2021-11-29 PROCEDURE — 82607 VITAMIN B-12: CPT | Performed by: INTERNAL MEDICINE

## 2021-11-29 PROCEDURE — 80053 COMPREHEN METABOLIC PANEL: CPT | Performed by: INTERNAL MEDICINE

## 2021-11-29 PROCEDURE — 4010F ACE/ARB THERAPY RXD/TAKEN: CPT | Mod: CPTII,S$GLB,, | Performed by: INTERNAL MEDICINE

## 2021-11-29 PROCEDURE — 84153 ASSAY OF PSA TOTAL: CPT | Performed by: INTERNAL MEDICINE

## 2021-11-29 PROCEDURE — 36415 COLL VENOUS BLD VENIPUNCTURE: CPT | Performed by: INTERNAL MEDICINE

## 2021-11-29 PROCEDURE — 99397 PER PM REEVAL EST PAT 65+ YR: CPT | Mod: 25,S$GLB,, | Performed by: INTERNAL MEDICINE

## 2021-11-29 PROCEDURE — 99999 PR PBB SHADOW E&M-EST. PATIENT-LVL III: CPT | Mod: PBBFAC,,, | Performed by: INTERNAL MEDICINE

## 2021-11-29 PROCEDURE — 82306 VITAMIN D 25 HYDROXY: CPT | Performed by: INTERNAL MEDICINE

## 2021-11-29 PROCEDURE — 83036 HEMOGLOBIN GLYCOSYLATED A1C: CPT | Performed by: INTERNAL MEDICINE

## 2021-11-29 PROCEDURE — 99397 PR PREVENTIVE VISIT,EST,65 & OVER: ICD-10-PCS | Mod: 25,S$GLB,, | Performed by: INTERNAL MEDICINE

## 2021-11-29 PROCEDURE — 90471 IMMUNIZATION ADMIN: CPT | Mod: S$GLB,,, | Performed by: INTERNAL MEDICINE

## 2021-11-29 PROCEDURE — 90694 VACC AIIV4 NO PRSRV 0.5ML IM: CPT | Mod: S$GLB,,, | Performed by: INTERNAL MEDICINE

## 2021-11-29 RX ORDER — INSULIN ASPART 100 [IU]/ML
INJECTION, SOLUTION INTRAVENOUS; SUBCUTANEOUS
Qty: 30 ML | Refills: 6 | Status: SHIPPED | OUTPATIENT
Start: 2021-11-29 | End: 2022-12-01 | Stop reason: SDUPTHER

## 2021-11-29 RX ORDER — ENALAPRIL MALEATE 20 MG/1
20 TABLET ORAL 2 TIMES DAILY
Qty: 180 TABLET | Refills: 4 | Status: SHIPPED | OUTPATIENT
Start: 2021-11-29 | End: 2022-12-01 | Stop reason: SDUPTHER

## 2021-11-29 RX ORDER — AMLODIPINE BESYLATE 10 MG/1
10 TABLET ORAL DAILY
Qty: 90 TABLET | Refills: 4 | Status: SHIPPED | OUTPATIENT
Start: 2021-11-29 | End: 2022-12-01 | Stop reason: SDUPTHER

## 2021-11-29 RX ORDER — OLANZAPINE 2.5 MG/1
2.5 TABLET ORAL 2 TIMES DAILY
Qty: 60 TABLET | Refills: 6 | Status: SHIPPED | OUTPATIENT
Start: 2021-11-29 | End: 2022-09-22

## 2021-11-29 RX ORDER — FLUOXETINE HYDROCHLORIDE 40 MG/1
40 CAPSULE ORAL DAILY
Qty: 30 CAPSULE | Refills: 4 | Status: SHIPPED | OUTPATIENT
Start: 2021-11-29 | End: 2022-06-07

## 2021-11-29 RX ORDER — DIVALPROEX SODIUM 500 MG/1
TABLET, FILM COATED, EXTENDED RELEASE ORAL
Qty: 90 TABLET | Refills: 4 | Status: SHIPPED | OUTPATIENT
Start: 2021-11-29 | End: 2022-12-01 | Stop reason: SDUPTHER

## 2021-11-29 RX ORDER — OMEPRAZOLE 20 MG/1
20 CAPSULE, DELAYED RELEASE ORAL DAILY
Qty: 90 CAPSULE | Refills: 3 | Status: SHIPPED | OUTPATIENT
Start: 2021-11-29 | End: 2022-11-28

## 2021-11-29 RX ORDER — DIVALPROEX SODIUM 250 MG/1
250 TABLET, FILM COATED, EXTENDED RELEASE ORAL DAILY
Qty: 90 TABLET | Refills: 4 | Status: SHIPPED | OUTPATIENT
Start: 2021-11-29 | End: 2022-12-01 | Stop reason: SDUPTHER

## 2021-11-29 RX ORDER — ROSUVASTATIN CALCIUM 10 MG/1
10 TABLET, COATED ORAL NIGHTLY
Qty: 90 TABLET | Refills: 4 | Status: SHIPPED | OUTPATIENT
Start: 2021-11-29 | End: 2022-12-01 | Stop reason: SDUPTHER

## 2021-11-29 RX ORDER — INSULIN GLARGINE 100 [IU]/ML
INJECTION, SOLUTION SUBCUTANEOUS
Qty: 15 ML | Refills: 6 | Status: SHIPPED | OUTPATIENT
Start: 2021-11-29 | End: 2022-11-28

## 2021-11-29 RX ORDER — BENZTROPINE MESYLATE 0.5 MG/1
0.5 TABLET ORAL 2 TIMES DAILY
Qty: 180 TABLET | Refills: 4 | Status: SHIPPED | OUTPATIENT
Start: 2021-11-29 | End: 2022-12-01 | Stop reason: SDUPTHER

## 2021-12-05 ENCOUNTER — TELEPHONE (OUTPATIENT)
Dept: INTERNAL MEDICINE | Facility: CLINIC | Age: 71
End: 2021-12-05
Payer: COMMERCIAL

## 2021-12-05 DIAGNOSIS — D64.9 ANEMIA, UNSPECIFIED TYPE: Primary | ICD-10-CM

## 2021-12-09 ENCOUNTER — LAB VISIT (OUTPATIENT)
Dept: LAB | Facility: HOSPITAL | Age: 71
End: 2021-12-09
Attending: INTERNAL MEDICINE
Payer: COMMERCIAL

## 2021-12-09 DIAGNOSIS — D64.9 ANEMIA, UNSPECIFIED TYPE: ICD-10-CM

## 2021-12-09 LAB
BASOPHILS # BLD AUTO: 0.03 K/UL (ref 0–0.2)
BASOPHILS NFR BLD: 0.6 % (ref 0–1.9)
DIFFERENTIAL METHOD: ABNORMAL
EOSINOPHIL # BLD AUTO: 0.3 K/UL (ref 0–0.5)
EOSINOPHIL NFR BLD: 5.1 % (ref 0–8)
ERYTHROCYTE [DISTWIDTH] IN BLOOD BY AUTOMATED COUNT: 14 % (ref 11.5–14.5)
FERRITIN SERPL-MCNC: 32 NG/ML (ref 20–300)
FOLATE SERPL-MCNC: >40 NG/ML (ref 4–24)
HAPTOGLOB SERPL-MCNC: 99 MG/DL (ref 30–250)
HCT VFR BLD AUTO: 38.7 % (ref 40–54)
HGB BLD-MCNC: 11.9 G/DL (ref 14–18)
IMM GRANULOCYTES # BLD AUTO: 0 K/UL (ref 0–0.04)
IMM GRANULOCYTES NFR BLD AUTO: 0 % (ref 0–0.5)
IRON SERPL-MCNC: 52 UG/DL (ref 45–160)
LDH SERPL L TO P-CCNC: 130 U/L (ref 110–260)
LYMPHOCYTES # BLD AUTO: 2 K/UL (ref 1–4.8)
LYMPHOCYTES NFR BLD: 37.1 % (ref 18–48)
MCH RBC QN AUTO: 28.3 PG (ref 27–31)
MCHC RBC AUTO-ENTMCNC: 30.7 G/DL (ref 32–36)
MCV RBC AUTO: 92 FL (ref 82–98)
MONOCYTES # BLD AUTO: 0.5 K/UL (ref 0.3–1)
MONOCYTES NFR BLD: 9.6 % (ref 4–15)
NEUTROPHILS # BLD AUTO: 2.6 K/UL (ref 1.8–7.7)
NEUTROPHILS NFR BLD: 47.6 % (ref 38–73)
NRBC BLD-RTO: 0 /100 WBC
PLATELET # BLD AUTO: 290 K/UL (ref 150–450)
PMV BLD AUTO: 11.6 FL (ref 9.2–12.9)
RBC # BLD AUTO: 4.2 M/UL (ref 4.6–6.2)
SATURATED IRON: 15 % (ref 20–50)
TOTAL IRON BINDING CAPACITY: 351 UG/DL (ref 250–450)
TRANSFERRIN SERPL-MCNC: 237 MG/DL (ref 200–375)
WBC # BLD AUTO: 5.34 K/UL (ref 3.9–12.7)

## 2021-12-09 PROCEDURE — 82746 ASSAY OF FOLIC ACID SERUM: CPT | Performed by: INTERNAL MEDICINE

## 2021-12-09 PROCEDURE — 84165 PROTEIN E-PHORESIS SERUM: CPT | Performed by: INTERNAL MEDICINE

## 2021-12-09 PROCEDURE — 85025 COMPLETE CBC W/AUTO DIFF WBC: CPT | Performed by: INTERNAL MEDICINE

## 2021-12-09 PROCEDURE — 36415 COLL VENOUS BLD VENIPUNCTURE: CPT | Performed by: INTERNAL MEDICINE

## 2021-12-09 PROCEDURE — 82728 ASSAY OF FERRITIN: CPT | Performed by: INTERNAL MEDICINE

## 2021-12-09 PROCEDURE — 84466 ASSAY OF TRANSFERRIN: CPT | Performed by: INTERNAL MEDICINE

## 2021-12-09 PROCEDURE — 83010 ASSAY OF HAPTOGLOBIN QUANT: CPT | Performed by: INTERNAL MEDICINE

## 2021-12-09 PROCEDURE — 84165 PROTEIN E-PHORESIS SERUM: CPT | Mod: 26,,, | Performed by: PATHOLOGY

## 2021-12-09 PROCEDURE — 84165 PATHOLOGIST INTERPRETATION SPE: ICD-10-PCS | Mod: 26,,, | Performed by: PATHOLOGY

## 2021-12-09 PROCEDURE — 83615 LACTATE (LD) (LDH) ENZYME: CPT | Performed by: INTERNAL MEDICINE

## 2021-12-10 LAB
ALBUMIN SERPL ELPH-MCNC: 3.64 G/DL (ref 3.35–5.55)
ALPHA1 GLOB SERPL ELPH-MCNC: 0.32 G/DL (ref 0.17–0.41)
ALPHA2 GLOB SERPL ELPH-MCNC: 0.71 G/DL (ref 0.43–0.99)
B-GLOBULIN SERPL ELPH-MCNC: 0.82 G/DL (ref 0.5–1.1)
GAMMA GLOB SERPL ELPH-MCNC: 1.21 G/DL (ref 0.67–1.58)
PATHOLOGIST INTERPRETATION SPE: NORMAL
PROT SERPL-MCNC: 6.7 G/DL (ref 6–8.4)

## 2021-12-13 ENCOUNTER — TELEPHONE (OUTPATIENT)
Dept: INTERNAL MEDICINE | Facility: CLINIC | Age: 71
End: 2021-12-13
Payer: COMMERCIAL

## 2021-12-15 ENCOUNTER — TELEPHONE (OUTPATIENT)
Dept: INTERNAL MEDICINE | Facility: CLINIC | Age: 71
End: 2021-12-15
Payer: COMMERCIAL

## 2021-12-15 DIAGNOSIS — D64.9 ANEMIA, UNSPECIFIED TYPE: Primary | ICD-10-CM

## 2021-12-17 ENCOUNTER — TELEPHONE (OUTPATIENT)
Dept: INTERNAL MEDICINE | Facility: CLINIC | Age: 71
End: 2021-12-17
Payer: COMMERCIAL

## 2021-12-17 NOTE — TELEPHONE ENCOUNTER
Pt uncle would like pt signed up for social security. Brayan would like a letter from pt PCP stating the pt name birth date and last 4 digits of ssn, and states that pt is under the care of PCP to include name. Brayan would like this left at the  for care giver to .

## 2021-12-17 NOTE — TELEPHONE ENCOUNTER
----- Message from Joan Elizabeth sent at 12/17/2021  3:45 PM CST -----  Contact: [phone]/ Brayan Irwin  Patient would like to get medical advice.    Comments:   Calling regarding getting a letter drafted for the patient.

## 2021-12-22 DIAGNOSIS — E11.9 TYPE 2 DIABETES MELLITUS WITHOUT COMPLICATION: ICD-10-CM

## 2021-12-23 ENCOUNTER — TELEPHONE (OUTPATIENT)
Dept: INTERNAL MEDICINE | Facility: CLINIC | Age: 71
End: 2021-12-23
Payer: COMMERCIAL

## 2021-12-23 NOTE — TELEPHONE ENCOUNTER
Nephew stated he did not fax anything over just need paperwork filled out whenever you get a chance please.

## 2021-12-23 NOTE — TELEPHONE ENCOUNTER
----- Message from Caron Calderon sent at 12/23/2021 12:53 PM CST -----  Contact: [phone] Nephew Mr. Schmidt  Patient's nephew would like to speak to the nurse in regards to a letter request needed to be able to apply for medicare. Patient's nephew states letter has to state patient's name, date of birth and last four of SS and state how long patient has been a patient of Dr. Boone. Patient's nephew can stop by next week and  letter. Patient's nephew would like to speak to the nurse also to find out if documents sent last week were received. Please call and advise.

## 2021-12-23 NOTE — LETTER
January 1, 2022    Layo Haney  [address] [address]     Date of Birth 1950    Last 4 digits of social security number: [id number]          [facility] [address]  [address]  [facility] [address]  Phone: [phone]  Fax: [phone] To Whom It May Concern:    Mr Payton has been my patient since 8/13/2007.      Sincerely,        Gemma Boone MD

## 2022-01-02 NOTE — TELEPHONE ENCOUNTER
PLease confirm with Costa that Brayan Vila is Mr Payton's nephew and confirm that Mr Vila has power of     Let me know. I want to confirm before I write this letter.

## 2022-01-03 ENCOUNTER — TELEPHONE (OUTPATIENT)
Dept: INTERNAL MEDICINE | Facility: CLINIC | Age: 72
End: 2022-01-03
Payer: COMMERCIAL

## 2022-01-03 NOTE — TELEPHONE ENCOUNTER
----- Message from Malka Ruiz sent at 1/3/2022  3:01 PM CST -----  Contact: Costa partner [phone]  Patient is returning a phone call.  Who left a message for the patient: Dr Boone  Does patient know what this is regarding:    Would you like a call back, or a response through your [facility] portal?: call back  Comments: Costa was calling to tell Dr Boone that Cristian Vila is his power of  and Ms Skelton said to tell you hi and happy new year

## 2022-01-13 ENCOUNTER — TELEPHONE (OUTPATIENT)
Dept: HEMATOLOGY/ONCOLOGY | Facility: CLINIC | Age: 72
End: 2022-01-13
Payer: COMMERCIAL

## 2022-01-13 NOTE — TELEPHONE ENCOUNTER
----- Message from Milan Martines sent at 1/13/2022 10:38 AM CST -----  Regarding: Appt Inquiry  Pt room mate ( Costa ) called requesting to reschedule / cancel tomorrows appt . Stated she would call back to reschedule.    [phone] (home)

## 2022-01-19 ENCOUNTER — PATIENT MESSAGE (OUTPATIENT)
Dept: ADMINISTRATIVE | Facility: HOSPITAL | Age: 72
End: 2022-01-19
Payer: COMMERCIAL

## 2022-02-08 ENCOUNTER — TELEPHONE (OUTPATIENT)
Dept: HEMATOLOGY/ONCOLOGY | Facility: CLINIC | Age: 72
End: 2022-02-08
Payer: COMMERCIAL

## 2022-02-08 ENCOUNTER — TELEPHONE (OUTPATIENT)
Dept: INTERNAL MEDICINE | Facility: CLINIC | Age: 72
End: 2022-02-08
Payer: COMMERCIAL

## 2022-02-08 NOTE — TELEPHONE ENCOUNTER
"----- Message from Pilar Redmond sent at 2/8/2022  1:39 PM CST -----         Consult/Advisory:      Name Of Caller:Costa   Contact Preference?:[phone]      Provider Name: Fitz  Does patient feel the need to be seen today?no      What is the nature of the call?:She's calling to see if patient can get a sooner appt      Additional Notes:  "Thank you for all that you do for our patients'"                           "

## 2022-02-09 NOTE — TELEPHONE ENCOUNTER
----- Message from Sylvia Lamar sent at 2/9/2022 10:48 AM CST -----  Contact: Friend/Ysabel/[phone]  Patient is returning a phone call.  Who left a message for the patient: Gwen  Does patient know what this is regarding: pt needing to be hospitalized  Would you like a call back, or a response through your [facility] portal? Call back   Comments: she stated that she thinks pt maybe dehydrated,Is falling she stated that If she does not answer please leave a detailed message

## 2022-02-09 NOTE — TELEPHONE ENCOUNTER
----- Message from Lissa Gomez sent at 2/8/2022  1:45 PM CST -----  Contact: jake(wife) [phone]  Wife states that pt has been shaking since his last fall. Please call and advise

## 2022-02-09 NOTE — TELEPHONE ENCOUNTER
----- Message from Niki Barton sent at 2/9/2022  9:23 AM CST -----  Contact: Costa [phone]  Costa is calling she is wanting to take him to the ED she thinks he needs to be checked into the hospital please advise and give a her a return call she state she is very worried aboput the pt due to what is going on with him

## 2022-02-11 ENCOUNTER — TELEPHONE (OUTPATIENT)
Dept: INTERNAL MEDICINE | Facility: CLINIC | Age: 72
End: 2022-02-11
Payer: COMMERCIAL

## 2022-02-11 NOTE — TELEPHONE ENCOUNTER
----- Message from Stella Dhillon sent at 2/11/2022  3:07 PM CST -----  Contact: [phone] Brayan Irwin(nephew)  Pt nephew states Social Security will be faxing a form for Dr Boone to sign & complete regarding the pt mental capacity. Pt nephew states the form will need to be faxed back to social security. Pt nephew states to please contact him with any questions.

## 2022-03-14 DIAGNOSIS — E11.9 TYPE 2 DIABETES MELLITUS WITHOUT COMPLICATION: ICD-10-CM

## 2022-03-16 ENCOUNTER — PATIENT MESSAGE (OUTPATIENT)
Dept: ADMINISTRATIVE | Facility: HOSPITAL | Age: 72
End: 2022-03-16
Payer: COMMERCIAL

## 2022-03-18 ENCOUNTER — TELEPHONE (OUTPATIENT)
Dept: INTERNAL MEDICINE | Facility: CLINIC | Age: 72
End: 2022-03-18
Payer: COMMERCIAL

## 2022-03-18 ENCOUNTER — OFFICE VISIT (OUTPATIENT)
Dept: HEMATOLOGY/ONCOLOGY | Facility: CLINIC | Age: 72
End: 2022-03-18
Payer: COMMERCIAL

## 2022-03-18 VITALS
SYSTOLIC BLOOD PRESSURE: 140 MMHG | HEIGHT: 72 IN | WEIGHT: 166.75 LBS | TEMPERATURE: 99 F | BODY MASS INDEX: 22.59 KG/M2 | OXYGEN SATURATION: 97 % | HEART RATE: 72 BPM | RESPIRATION RATE: 18 BRPM | DIASTOLIC BLOOD PRESSURE: 63 MMHG

## 2022-03-18 DIAGNOSIS — D64.9 ANEMIA, UNSPECIFIED TYPE: ICD-10-CM

## 2022-03-18 DIAGNOSIS — D50.9 IRON DEFICIENCY ANEMIA, UNSPECIFIED IRON DEFICIENCY ANEMIA TYPE: Primary | ICD-10-CM

## 2022-03-18 PROCEDURE — 4010F PR ACE/ARB THEARPY RXD/TAKEN: ICD-10-PCS | Mod: CPTII,S$GLB,, | Performed by: INTERNAL MEDICINE

## 2022-03-18 PROCEDURE — 1159F MED LIST DOCD IN RCRD: CPT | Mod: CPTII,S$GLB,, | Performed by: INTERNAL MEDICINE

## 2022-03-18 PROCEDURE — 3078F DIAST BP <80 MM HG: CPT | Mod: CPTII,S$GLB,, | Performed by: INTERNAL MEDICINE

## 2022-03-18 PROCEDURE — 3077F PR MOST RECENT SYSTOLIC BLOOD PRESSURE >= 140 MM HG: ICD-10-PCS | Mod: CPTII,S$GLB,, | Performed by: INTERNAL MEDICINE

## 2022-03-18 PROCEDURE — 1126F AMNT PAIN NOTED NONE PRSNT: CPT | Mod: CPTII,S$GLB,, | Performed by: INTERNAL MEDICINE

## 2022-03-18 PROCEDURE — 1126F PR PAIN SEVERITY QUANTIFIED, NO PAIN PRESENT: ICD-10-PCS | Mod: CPTII,S$GLB,, | Performed by: INTERNAL MEDICINE

## 2022-03-18 PROCEDURE — 1160F RVW MEDS BY RX/DR IN RCRD: CPT | Mod: CPTII,S$GLB,, | Performed by: INTERNAL MEDICINE

## 2022-03-18 PROCEDURE — 99203 OFFICE O/P NEW LOW 30 MIN: CPT | Mod: S$GLB,,, | Performed by: INTERNAL MEDICINE

## 2022-03-18 PROCEDURE — 99999 PR PBB SHADOW E&M-EST. PATIENT-LVL V: ICD-10-PCS | Mod: PBBFAC,,, | Performed by: INTERNAL MEDICINE

## 2022-03-18 PROCEDURE — 3008F BODY MASS INDEX DOCD: CPT | Mod: CPTII,S$GLB,, | Performed by: INTERNAL MEDICINE

## 2022-03-18 PROCEDURE — 99203 PR OFFICE/OUTPT VISIT, NEW, LEVL III, 30-44 MIN: ICD-10-PCS | Mod: S$GLB,,, | Performed by: INTERNAL MEDICINE

## 2022-03-18 PROCEDURE — 3077F SYST BP >= 140 MM HG: CPT | Mod: CPTII,S$GLB,, | Performed by: INTERNAL MEDICINE

## 2022-03-18 PROCEDURE — 1159F PR MEDICATION LIST DOCUMENTED IN MEDICAL RECORD: ICD-10-PCS | Mod: CPTII,S$GLB,, | Performed by: INTERNAL MEDICINE

## 2022-03-18 PROCEDURE — 4010F ACE/ARB THERAPY RXD/TAKEN: CPT | Mod: CPTII,S$GLB,, | Performed by: INTERNAL MEDICINE

## 2022-03-18 PROCEDURE — 1160F PR REVIEW ALL MEDS BY PRESCRIBER/CLIN PHARMACIST DOCUMENTED: ICD-10-PCS | Mod: CPTII,S$GLB,, | Performed by: INTERNAL MEDICINE

## 2022-03-18 PROCEDURE — 3078F PR MOST RECENT DIASTOLIC BLOOD PRESSURE < 80 MM HG: ICD-10-PCS | Mod: CPTII,S$GLB,, | Performed by: INTERNAL MEDICINE

## 2022-03-18 PROCEDURE — 99999 PR PBB SHADOW E&M-EST. PATIENT-LVL V: CPT | Mod: PBBFAC,,, | Performed by: INTERNAL MEDICINE

## 2022-03-18 PROCEDURE — 3008F PR BODY MASS INDEX (BMI) DOCUMENTED: ICD-10-PCS | Mod: CPTII,S$GLB,, | Performed by: INTERNAL MEDICINE

## 2022-03-18 RX ORDER — FERROUS SULFATE 324(65)MG
324 TABLET, DELAYED RELEASE (ENTERIC COATED) ORAL EVERY OTHER DAY
Qty: 45 TABLET | Refills: 3 | Status: SHIPPED | OUTPATIENT
Start: 2022-03-18 | End: 2023-02-06

## 2022-03-18 NOTE — PROGRESS NOTES
HEMATOLOGIC MALIGNANCIES CONSULT NOTE    IDENTIFYING STATEMENT   Layo Arango) is a 71 y.o. male with a  of 1950 from [place], referred by Dr. Boone for evaluation of anemia.     HISTORY OF PRESENT ILLNESS:      Mr. Haney is 71, has vascular dementia, bipolar disorder, DM-I, and is referred for evaluation of anemia.     He has long-standing history of anemia, dating back to at least 2017. His hemoglobin has gradually declined from 13.1 g/dl to 11.9 g/dl most recently. MCV is normal.     Evaluation by his PCP included iron studies, which were consistent with iron deficiency (low ferritin, low saturated iron). Folate and B12 studies were normal. TSH was normal. SPEP was normal. LDH and haptoglobin were normal. He was referred to hematology.    Neither the patient nor his relative present with him today know why he is here. They state no information was given.     Past Medical History:   Diagnosis Date    Bipolar disorder 2013    GERD (gastroesophageal reflux disease) 2013    Hepatitis C antibody (+) - appears he cleared virus on his own. no need for treatment. 2013    Hypertension 2013    Seizure disorder 2013    Type 1 diabetes mellitus 2013    Vascular dementia, uncomplicated 2013       History reviewed. No pertinent family history.    Social History     Socioeconomic History    Marital status: Single   Tobacco Use    Smoking status: Former Smoker    Smokeless tobacco: Never Used    Tobacco comment: 3 cigarettes daily now   Substance and Sexual Activity    Alcohol use: No    Drug use: No         MEDICATIONS:     Current Outpatient Medications on File Prior to Visit   Medication Sig Dispense Refill    amLODIPine (NORVASC) 10 MG tablet Take 1 tablet (10 mg total) by mouth once daily. 90 tablet 4    ASHWAGANDHA ROOT EXTRACT,BULK, MISC by Misc.(Non-Drug; Combo Route) route. orgainic ashwagandha 380 mg plus ashwagandha extract 95 mg - one capsule  "twice daily      benztropine (COGENTIN) 0.5 MG tablet Take 1 tablet (0.5 mg total) by mouth 2 (two) times daily. 180 tablet 4    blood sugar diagnostic (CONTOUR NEXT TEST STRIPS) Strp To check BG 5 (five) times daily, to use with insurance preferred meter 500 strip 3    divalproex ER (DEPAKOTE ER) 250 MG 24 hr tablet Take 1 tablet (250 mg total) by mouth once daily. 90 tablet 4    divalproex ER (DEPAKOTE) 500 MG Tb24 Take 1 tablet by mouth every evening 90 tablet 4    enalapril (VASOTEC) 20 MG tablet Take 1 tablet (20 mg total) by mouth 2 (two) times daily. 180 tablet 4    insulin (LANTUS SOLOSTAR U-100 INSULIN) glargine 100 units/mL (3mL) SubQ pen Inject 20 Units into the skin every evening. 15 mL 6    insulin aspart U-100 (NOVOLOG FLEXPEN U-100 INSULIN) 100 unit/mL (3 mL) InPn pen INJECT 10-15 UNITS three times WITH MEALS 30 mL 6    ketoconazole (NIZORAL) 2 % shampoo Apply topically twice a week. 240 mL 3    lancets Misc To check BG 5 (five) times daily, to use with insurance preferred meter 500 each 3    mupirocin (BACTROBAN) 2 % ointment Apply to wounds twice daily 180 g 3    mupirocin (BACTROBAN) 2 % ointment Apply to wounds twice daily 180 g 3    OLANZapine (ZYPREXA) 2.5 MG tablet Take 1 tablet (2.5 mg total) by mouth 2 (two) times daily. 60 tablet 6    omeprazole (PRILOSEC) 20 MG capsule Take 1 capsule (20 mg total) by mouth once daily. 90 capsule 3    pen needle, diabetic (BD ULTRA-FINE MINI PEN NEEDLE) 31 gauge x 3/16" Ndle USE FOUR TIMES DAILY 100 each 11    rosuvastatin (CRESTOR) 10 MG tablet Take 1 tablet (10 mg total) by mouth every evening. 90 tablet 4    UNKNOWN TO PATIENT holy basil leaf extract 292 mg and holy basil leaf supercritical extract 190 mg) one capsule twice daily.      acetaminophen (TYLENOL) 500 MG tablet Take 2 tablets (1,000 mg total) by mouth every 8 (eight) hours as needed for Pain. (Patient not taking: No sig reported) 100 tablet 3    FLUoxetine 40 MG capsule Take " 1 capsule (40 mg total) by mouth once daily. 30 capsule 4     No current facility-administered medications on file prior to visit.       ALLERGIES: Review of patient's allergies indicates:  No Known Allergies     ROS:       Review of Systems   Constitutional: Negative for diaphoresis, fatigue, fever and unexpected weight change.   HENT:   Negative for lump/mass and sore throat.    Eyes: Negative for icterus.   Respiratory: Negative for cough and shortness of breath.    Cardiovascular: Negative for chest pain and palpitations.   Gastrointestinal: Negative for abdominal distention, constipation, diarrhea, nausea and vomiting.   Genitourinary: Negative for dysuria and frequency.    Musculoskeletal: Negative for arthralgias, gait problem and myalgias.   Skin: Negative for rash.   Neurological: Negative for dizziness, gait problem and headaches.   Hematological: Negative for adenopathy. Does not bruise/bleed easily.   Psychiatric/Behavioral: The patient is not nervous/anxious.        PHYSICAL EXAM:  Vitals:    03/18/22 1309   BP: (!) 140/63   Pulse: 72   Resp: 18   Temp: 99.4 °F (37.4 °C)   TempSrc: Oral   SpO2: 97%   Weight: 75.7 kg (166 lb 12.5 oz)   Height: 6' (1.829 m)   PainSc: 0-No pain       Physical Exam  Constitutional:       General: He is not in acute distress.     Appearance: He is well-developed.   HENT:      Head: Normocephalic and atraumatic.      Mouth/Throat:      Mouth: No oral lesions.   Eyes:      Conjunctiva/sclera: Conjunctivae normal.   Neck:      Thyroid: No thyromegaly.   Cardiovascular:      Rate and Rhythm: Normal rate and regular rhythm.      Heart sounds: Normal heart sounds. No murmur heard.  Pulmonary:      Breath sounds: Normal breath sounds. No wheezing or rales.   Abdominal:      General: There is no distension.      Palpations: Abdomen is soft. There is no hepatomegaly, splenomegaly or mass.      Tenderness: There is no abdominal tenderness.   Lymphadenopathy:      Cervical: No cervical  adenopathy.      Right cervical: No deep cervical adenopathy.     Left cervical: No deep cervical adenopathy.   Skin:     Findings: No rash.   Neurological:      Mental Status: He is alert and oriented to person, place, and time.      Cranial Nerves: No cranial nerve deficit.      Coordination: Coordination normal.      Deep Tendon Reflexes: Reflexes are normal and symmetric.         LAB:   Results for orders placed or performed in visit on 12/09/21   CBC Auto Differential   Result Value Ref Range    WBC 5.34 3.90 - 12.70 K/uL    RBC 4.20 (L) 4.60 - 6.20 M/uL    Hemoglobin 11.9 (L) 14.0 - 18.0 g/dL    Hematocrit 38.7 (L) 40.0 - 54.0 %    MCV 92 82 - 98 fL    MCH 28.3 27.0 - 31.0 pg    MCHC 30.7 (L) 32.0 - 36.0 g/dL    RDW 14.0 11.5 - 14.5 %    Platelets 290 150 - 450 K/uL    MPV 11.6 9.2 - 12.9 fL    Immature Granulocytes 0.0 0.0 - 0.5 %    Gran # (ANC) 2.6 1.8 - 7.7 K/uL    Immature Grans (Abs) 0.00 0.00 - 0.04 K/uL    Lymph # 2.0 1.0 - 4.8 K/uL    Mono # 0.5 0.3 - 1.0 K/uL    Eos # 0.3 0.0 - 0.5 K/uL    Baso # 0.03 0.00 - 0.20 K/uL    nRBC 0 0 /100 WBC    Gran % 47.6 38.0 - 73.0 %    Lymph % 37.1 18.0 - 48.0 %    Mono % 9.6 4.0 - 15.0 %    Eosinophil % 5.1 0.0 - 8.0 %    Basophil % 0.6 0.0 - 1.9 %    Differential Method Automated    Ferritin   Result Value Ref Range    Ferritin 32 20.0 - 300.0 ng/mL   Iron and TIBC   Result Value Ref Range    Iron 52 45 - 160 ug/dL    Transferrin 237 200 - 375 mg/dL    TIBC 351 250 - 450 ug/dL    Saturated Iron 15 (L) 20 - 50 %   Lactate Dehydrogenase   Result Value Ref Range     110 - 260 U/L   Protein Electrophoresis, Serum   Result Value Ref Range    Protein, Serum 6.7 6.0 - 8.4 g/dL    Albumin 3.64 3.35 - 5.55 g/dL    Alpha-1 0.32 0.17 - 0.41 g/dL    Alpha-2 0.71 0.43 - 0.99 g/dL    Beta 0.82 0.50 - 1.10 g/dL    Gamma 1.21 0.67 - 1.58 g/dL   Haptoglobin   Result Value Ref Range    Haptoglobin 99 30 - 250 mg/dL   Folate   Result Value Ref Range    Folate >40.0 (H) 4.0 -  24.0 ng/mL   Pathologist Interpretation SPE   Result Value Ref Range    Pathologist Interpretation SPE REVIEWED        PROBLEMS ASSESSED THIS VISIT:    1. Iron deficiency anemia, unspecified iron deficiency anemia type    2. Anemia, unspecified type        IMPRESSION:    1. Iron deficiency anemia    2. Vascular dementia  3. Seizure disorder  4. Extrapyramidal movement disorder  5. Bipolar disorder  6. Diabetes mellitus, type 1, complicated by retinopathy  7. Hypertension  8. History of Hepatitis C  9. Gastroesophageal reflux disease     PLAN:       Iron deficiency anemia  Mr. Haney has iron deficiency anemia. The cause is uncertain. It is possibly due to inadequate dietary intake, as he has a very limited variety of nutritional sources.     He has not had prior colonoscopy or other evaluation for occult GI bleeding. Recommend his PCP discuss this evaluation with him and family, as the presence of iron deficiency anemia makes this an imperative.    Discussed supplementation of iron, and the patient's family prefers oral supplementation at this time (as opposed to intravenous iron). Instructed to take iron supplements once every other day at least 30 minutes prior to eating. We will recheck laboratory findings in 3 months to assess for correctin of iron deficiency.    Follow-up  Labs in 3 months  Clinic follow-up pending results    Jose Byrnes MD  Hematology and Stem Cell Transplant

## 2022-03-18 NOTE — TELEPHONE ENCOUNTER
----- Message from Addy Elizabeth sent at 3/18/2022  1:25 PM CDT -----  Contact: Cristian Irwin (nephew) [phone]  Pt nephew Cristian requesting a call back in regards to a form uncle need for social security.    Please call and advise

## 2022-03-18 NOTE — TELEPHONE ENCOUNTER
Spoke with Cristian (patient's nephew) he is calling to request the status of social Security paperwork that was sent in on either 2/11 or 2/14. He needs this form filled out stating that his uncle is his financial contact, which has previously been filled out by you. He states that this form is just a renewal form. Please Advise

## 2022-03-23 ENCOUNTER — TELEPHONE (OUTPATIENT)
Dept: INTERNAL MEDICINE | Facility: CLINIC | Age: 72
End: 2022-03-23
Payer: COMMERCIAL

## 2022-03-23 NOTE — TELEPHONE ENCOUNTER
Pt nephew stated that form that was mailed to social security had been misplaced. [employer] stated that they don't have it and he needs another copy sent off to them. He wants someone from the office to call him and notify him when it has been mailed. Please advise.     Suzi CEDENO

## 2022-03-23 NOTE — TELEPHONE ENCOUNTER
----- Message from Barbara Quiroz sent at 3/23/2022  3:07 PM CDT -----  Contact: [phone]  Pt's nephew (Cristian) called to speak to the office in regards to the information mailed to social security. Please Advise

## 2022-03-24 ENCOUNTER — TELEPHONE (OUTPATIENT)
Dept: INTERNAL MEDICINE | Facility: CLINIC | Age: 72
End: 2022-03-24
Payer: COMMERCIAL

## 2022-03-24 NOTE — TELEPHONE ENCOUNTER
Please let nephew know that I do not have these forms to fill out. He will have to get another form from social security

## 2022-03-24 NOTE — TELEPHONE ENCOUNTER
In order for me to write this letter, I need to see official documentation regarding the power of .

## 2022-03-24 NOTE — TELEPHONE ENCOUNTER
Spoke with Mr. Mays, he states that Social Security has not received the form that was mailed. He states that a letter can be written stating that you are treating the patient and have been for how long. The letter should states the patients  and SS# and list his nephew as the person whom takes care of his financial affairs. This letter should be faxed to [phone] Attn: Ms. Sterling.

## 2022-03-24 NOTE — TELEPHONE ENCOUNTER
----- Message from Alessia Hines sent at 3/24/2022  1:47 PM CDT -----  Contact: Cristian(Nephew) [phone]  Pt Is calling to f/u up on social security paper work !

## 2022-03-25 NOTE — TELEPHONE ENCOUNTER
Spoke to nephew(Brayan), he stated he would like to have a letter faxed over to social security stating you treat the pt, pt ss number, that Brayan Khanh handles everything for him.    Att:Ms. Sterling  Fax [phone]...

## 2022-03-28 ENCOUNTER — TELEPHONE (OUTPATIENT)
Dept: INTERNAL MEDICINE | Facility: CLINIC | Age: 72
End: 2022-03-28
Payer: COMMERCIAL

## 2022-03-29 ENCOUNTER — TELEPHONE (OUTPATIENT)
Dept: INTERNAL MEDICINE | Facility: CLINIC | Age: 72
End: 2022-03-29
Payer: COMMERCIAL

## 2022-03-29 NOTE — TELEPHONE ENCOUNTER
----- Message from Mikaela Shannon sent at 3/29/2022 12:26 PM CDT -----  Contact: [phone] @ [facility] Afternoon  Nephew would like to know if office has all information need to get paperwork he needs    Please call and confirm

## 2022-04-04 ENCOUNTER — TELEPHONE (OUTPATIENT)
Dept: INTERNAL MEDICINE | Facility: CLINIC | Age: 72
End: 2022-04-04
Payer: COMMERCIAL

## 2022-04-04 NOTE — TELEPHONE ENCOUNTER
----- Message from Adriana Orozco sent at 4/4/2022 11:21 AM CDT -----  Regarding: niecy call back  Contact: Mohini Schmidt [phone]  Request a call back from Katina

## 2022-04-22 ENCOUNTER — TELEPHONE (OUTPATIENT)
Dept: INTERNAL MEDICINE | Facility: CLINIC | Age: 72
End: 2022-04-22
Payer: MEDICARE

## 2022-04-22 NOTE — TELEPHONE ENCOUNTER
----- Message from Sarah Osorio sent at 4/21/2022  3:24 PM CDT -----  Contact: Mohini Schmidt [phone]  Patient is returning a phone call.  Who left a message for the patient: Katina Bhatti MA  Does patient know what this is regarding:  paper work  Would you like a call back, or a response through your [facility] portal?:   call back  Comments:

## 2022-04-22 NOTE — TELEPHONE ENCOUNTER
Pt nephew is calling to get a copy of the letter that was sent to [employer] Please fax to [phone].

## 2022-04-22 NOTE — LETTER
April 24, 2022    Layo Haney  [address] [facility] [address]       Date of Birth 1950    Social Security Number:       Wilian Rajput [facility]  [address]  [facility] [address]  Phone: [phone]  Fax: [phone] To Whom It May Concern:      Mr Layo Haney has been my patient since 8/13/2007.    Mr Haney was interdicted to Mr Brayan Irwin on June 6, 2007.  See attached documents provided by Mr. Brayan Irwin.     If you have any questions or concerns, please don't hesitate to call.    Sincerely,        Gemma Boone MD

## 2022-04-25 NOTE — TELEPHONE ENCOUNTER
Please fax letter and documentation to    Att:Ms. Sterling  Fax [phone]...    Fax letter to Brayan Irwin - see below.

## 2022-05-19 DIAGNOSIS — E10.9 TYPE 1 DIABETES MELLITUS WITHOUT COMPLICATION: ICD-10-CM

## 2022-05-19 NOTE — TELEPHONE ENCOUNTER
Refill Authorization Note   Layo Haney  is requesting a refill authorization.  Brief Assessment and Rationale for Refill:  Approve    -Medication-Related Problems Identified: Requires labs  Medication Therapy Plan:       Medication Reconciliation Completed: No   Comments:     Provider Staff:     Action is required for this patient.   Please see care gap opportunities below in Care Due Message.     Thanks!  [facility]     Appointments      Date Provider   Last Visit   11/29/2021 Gemma Boone MD   Next Visit   Visit date not found Gemma Boone MD     Note composed:11:39 AM 05/19/2022           Note composed:11:39 AM 05/19/2022

## 2022-05-19 NOTE — TELEPHONE ENCOUNTER
Care Due:                  Date            Visit Type   Department     Provider  --------------------------------------------------------------------------------                                EP -                              PRIMARY      [facility] INTER[facility]  Last Visit: 11-      CARE ([facility])   MEDICINE       [facility]  Next Visit: None Scheduled  None         None Found                                                            Last  Test          Frequency    Reason                     Performed    Due Date  --------------------------------------------------------------------------------    HBA1C.......  6 months...  insulin..................  11- 05-    [facility] Embedded Care Gaps. Reference number: [id number]. 5/19/2022   11:33:54 AM CDT

## 2022-05-29 ENCOUNTER — PATIENT MESSAGE (OUTPATIENT)
Dept: ADMINISTRATIVE | Facility: HOSPITAL | Age: 72
End: 2022-05-29
Payer: MEDICARE

## 2022-06-06 NOTE — TELEPHONE ENCOUNTER
No new care gaps identified.  [facility] Embedded Care Gaps. Reference number: [id number]. 6/06/2022   11:58:40 AM JOHNT

## 2022-06-07 RX ORDER — FLUOXETINE HYDROCHLORIDE 40 MG/1
40 CAPSULE ORAL DAILY
Qty: 90 CAPSULE | Refills: 1 | Status: SHIPPED | OUTPATIENT
Start: 2022-06-07 | End: 2022-11-28

## 2022-06-07 RX ORDER — MUPIROCIN 20 MG/G
OINTMENT TOPICAL
Qty: 180 G | Refills: 3 | Status: SHIPPED | OUTPATIENT
Start: 2022-06-07

## 2022-06-07 NOTE — TELEPHONE ENCOUNTER
Refill Routing Note   Medication(s) are not appropriate for processing by [facility] for the following reason(s):      - Outside of protocol    [facility] action(s):  Route  Approve       Medication Therapy Plan: router bactroban OOP; approve prozac  Medication reconciliation completed: Yes     Appointments  past 12m or future 3m with PCP    Date Provider   Last Visit   11/29/2021 Gemma Boone MD   Next Visit   Visit date not found Gemma Boone MD   ED visits in past 90 days: 0        Note composed:9:23 AM 06/07/2022

## 2022-06-28 ENCOUNTER — LAB VISIT (OUTPATIENT)
Dept: LAB | Facility: HOSPITAL | Age: 72
End: 2022-06-28
Attending: INTERNAL MEDICINE
Payer: MEDICARE

## 2022-06-28 ENCOUNTER — OFFICE VISIT (OUTPATIENT)
Dept: HEMATOLOGY/ONCOLOGY | Facility: CLINIC | Age: 72
End: 2022-06-28
Payer: MEDICARE

## 2022-06-28 VITALS
TEMPERATURE: 99 F | RESPIRATION RATE: 17 BRPM | OXYGEN SATURATION: 96 % | BODY MASS INDEX: 22.54 KG/M2 | HEIGHT: 72 IN | SYSTOLIC BLOOD PRESSURE: 134 MMHG | WEIGHT: 166.44 LBS | HEART RATE: 70 BPM | DIASTOLIC BLOOD PRESSURE: 87 MMHG

## 2022-06-28 DIAGNOSIS — D50.8 OTHER IRON DEFICIENCY ANEMIA: Primary | ICD-10-CM

## 2022-06-28 DIAGNOSIS — D50.9 IRON DEFICIENCY ANEMIA, UNSPECIFIED IRON DEFICIENCY ANEMIA TYPE: ICD-10-CM

## 2022-06-28 LAB
ALBUMIN SERPL BCP-MCNC: 3.1 G/DL (ref 3.5–5.2)
ALP SERPL-CCNC: 83 U/L (ref 55–135)
ALT SERPL W/O P-5'-P-CCNC: 24 U/L (ref 10–44)
ANION GAP SERPL CALC-SCNC: 11 MMOL/L (ref 8–16)
AST SERPL-CCNC: 18 U/L (ref 10–40)
BASOPHILS # BLD AUTO: 0.02 K/UL (ref 0–0.2)
BASOPHILS NFR BLD: 0.4 % (ref 0–1.9)
BILIRUB SERPL-MCNC: 0.4 MG/DL (ref 0.1–1)
BUN SERPL-MCNC: 15 MG/DL (ref 8–23)
CALCIUM SERPL-MCNC: 8.8 MG/DL (ref 8.7–10.5)
CHLORIDE SERPL-SCNC: 103 MMOL/L (ref 95–110)
CO2 SERPL-SCNC: 23 MMOL/L (ref 23–29)
CREAT SERPL-MCNC: 0.8 MG/DL (ref 0.5–1.4)
DIFFERENTIAL METHOD: ABNORMAL
EOSINOPHIL # BLD AUTO: 0.3 K/UL (ref 0–0.5)
EOSINOPHIL NFR BLD: 4.9 % (ref 0–8)
ERYTHROCYTE [DISTWIDTH] IN BLOOD BY AUTOMATED COUNT: 13.4 % (ref 11.5–14.5)
EST. GFR  (AFRICAN AMERICAN): >60 ML/MIN/1.73 M^2
EST. GFR  (NON AFRICAN AMERICAN): >60 ML/MIN/1.73 M^2
FERRITIN SERPL-MCNC: 55 NG/ML (ref 20–300)
GLUCOSE SERPL-MCNC: 271 MG/DL (ref 70–110)
HCT VFR BLD AUTO: 33.8 % (ref 40–54)
HGB BLD-MCNC: 11.2 G/DL (ref 14–18)
IMM GRANULOCYTES # BLD AUTO: 0.01 K/UL (ref 0–0.04)
IMM GRANULOCYTES NFR BLD AUTO: 0.2 % (ref 0–0.5)
IRON SERPL-MCNC: 73 UG/DL (ref 45–160)
LYMPHOCYTES # BLD AUTO: 2.1 K/UL (ref 1–4.8)
LYMPHOCYTES NFR BLD: 40.1 % (ref 18–48)
MCH RBC QN AUTO: 28.7 PG (ref 27–31)
MCHC RBC AUTO-ENTMCNC: 33.1 G/DL (ref 32–36)
MCV RBC AUTO: 87 FL (ref 82–98)
MONOCYTES # BLD AUTO: 0.5 K/UL (ref 0.3–1)
MONOCYTES NFR BLD: 9.2 % (ref 4–15)
NEUTROPHILS # BLD AUTO: 2.4 K/UL (ref 1.8–7.7)
NEUTROPHILS NFR BLD: 45.2 % (ref 38–73)
NRBC BLD-RTO: 0 /100 WBC
PLATELET # BLD AUTO: 260 K/UL (ref 150–450)
PMV BLD AUTO: 11.1 FL (ref 9.2–12.9)
POTASSIUM SERPL-SCNC: 4.6 MMOL/L (ref 3.5–5.1)
PROT SERPL-MCNC: 6.2 G/DL (ref 6–8.4)
RBC # BLD AUTO: 3.9 M/UL (ref 4.6–6.2)
RETICS/RBC NFR AUTO: 2.5 % (ref 0.4–2)
SATURATED IRON: 26 % (ref 20–50)
SODIUM SERPL-SCNC: 137 MMOL/L (ref 136–145)
TOTAL IRON BINDING CAPACITY: 277 UG/DL (ref 250–450)
TRANSFERRIN SERPL-MCNC: 187 MG/DL (ref 200–375)
WBC # BLD AUTO: 5.31 K/UL (ref 3.9–12.7)

## 2022-06-28 PROCEDURE — 80053 COMPREHEN METABOLIC PANEL: CPT | Performed by: INTERNAL MEDICINE

## 2022-06-28 PROCEDURE — 99213 OFFICE O/P EST LOW 20 MIN: CPT | Mod: PBBFAC | Performed by: INTERNAL MEDICINE

## 2022-06-28 PROCEDURE — 99999 PR PBB SHADOW E&M-EST. PATIENT-LVL III: ICD-10-PCS | Mod: PBBFAC,,, | Performed by: INTERNAL MEDICINE

## 2022-06-28 PROCEDURE — 85045 AUTOMATED RETICULOCYTE COUNT: CPT | Performed by: INTERNAL MEDICINE

## 2022-06-28 PROCEDURE — 36415 COLL VENOUS BLD VENIPUNCTURE: CPT | Performed by: INTERNAL MEDICINE

## 2022-06-28 PROCEDURE — 99214 PR OFFICE/OUTPT VISIT, EST, LEVL IV, 30-39 MIN: ICD-10-PCS | Mod: S$PBB,,, | Performed by: INTERNAL MEDICINE

## 2022-06-28 PROCEDURE — 82728 ASSAY OF FERRITIN: CPT | Performed by: INTERNAL MEDICINE

## 2022-06-28 PROCEDURE — 85025 COMPLETE CBC W/AUTO DIFF WBC: CPT | Performed by: INTERNAL MEDICINE

## 2022-06-28 PROCEDURE — 84466 ASSAY OF TRANSFERRIN: CPT | Performed by: INTERNAL MEDICINE

## 2022-06-28 PROCEDURE — 99999 PR PBB SHADOW E&M-EST. PATIENT-LVL III: CPT | Mod: PBBFAC,,, | Performed by: INTERNAL MEDICINE

## 2022-06-28 PROCEDURE — 99214 OFFICE O/P EST MOD 30 MIN: CPT | Mod: S$PBB,,, | Performed by: INTERNAL MEDICINE

## 2022-06-28 RX ORDER — HEPARIN 100 UNIT/ML
500 SYRINGE INTRAVENOUS
Status: CANCELLED | OUTPATIENT
Start: 2022-07-12

## 2022-06-28 RX ORDER — SODIUM CHLORIDE 0.9 % (FLUSH) 0.9 %
10 SYRINGE (ML) INJECTION
Status: CANCELLED | OUTPATIENT
Start: 2022-08-03

## 2022-06-28 RX ORDER — SODIUM CHLORIDE 0.9 % (FLUSH) 0.9 %
10 SYRINGE (ML) INJECTION
Status: CANCELLED | OUTPATIENT
Start: 2022-07-12

## 2022-06-28 RX ORDER — HEPARIN 100 UNIT/ML
500 SYRINGE INTRAVENOUS
Status: CANCELLED | OUTPATIENT
Start: 2022-08-03

## 2022-06-28 NOTE — Clinical Note
Please seek authorization for Injectafer. Once approved, please schedule two infusions, one week apart. Follow-upin [facility] clinic one month after Injectafer. Labs at time of follow-up: CBC, CMP, Iron/TIBC, ferritin, retic.

## 2022-07-05 ENCOUNTER — TELEPHONE (OUTPATIENT)
Dept: INTERNAL MEDICINE | Facility: CLINIC | Age: 72
End: 2022-07-05
Payer: MEDICARE

## 2022-07-05 DIAGNOSIS — I10 HYPERTENSION, UNSPECIFIED TYPE: Primary | ICD-10-CM

## 2022-07-05 DIAGNOSIS — E55.9 VITAMIN D DEFICIENCY DISEASE: ICD-10-CM

## 2022-07-05 DIAGNOSIS — E10.8 TYPE 1 DIABETES MELLITUS WITH COMPLICATION: ICD-10-CM

## 2022-07-05 NOTE — TELEPHONE ENCOUNTER
----- Message from Kimberley Boston sent at 7/5/2022 11:32 AM CDT -----  Contact: [phone]  Pt would like a call back about getting a hosp bed. Please call pt back.

## 2022-07-05 NOTE — PROGRESS NOTES
HEMATOLOGY PROGRESS NOTE    IDENTIFYING STATEMENT   Layo Arango) is a 71 y.o. male with a  of 1950 from [place] with the diagnosis of iron deficiency anemia.      HEMATOLOGY HISTORY:    1. Iron deficiency anemia     2. Vascular dementia  3. Seizure disorder  4. Extrapyramidal movement disorder  5. Bipolar disorder  6. Diabetes mellitus, type 1, complicated by retinopathy  7. Hypertension  8. History of Hepatitis C  9. Gastroesophageal reflux disease     INTERVAL HISTORY:      Mr. Haney returns to clinic for follow-up of iron deficiency anemia. He has been taking oral iron every other day since his initial visit. He is seeking to learn of the results of his repeat testing today.     Past Medical History, Past Social History and Past Family History have been reviewed and are unchanged except as noted in the interval history.    MEDICATIONS:     Current Outpatient Medications on File Prior to Visit   Medication Sig Dispense Refill    acetaminophen (TYLENOL) 500 MG tablet Take 2 tablets (1,000 mg total) by mouth every 8 (eight) hours as needed for Pain. (Patient not taking: No sig reported) 100 tablet 3    amLODIPine (NORVASC) 10 MG tablet Take 1 tablet (10 mg total) by mouth once daily. 90 tablet 4    ASHWAGANDHA ROOT EXTRACT,BULK, MISC by Misc.(Non-Drug; Combo Route) route. orgainic ashwagandha 380 mg plus ashwagandha extract 95 mg - one capsule twice daily      benztropine (COGENTIN) 0.5 MG tablet Take 1 tablet (0.5 mg total) by mouth 2 (two) times daily. 180 tablet 4    blood sugar diagnostic (CONTOUR NEXT TEST STRIPS) Strp To check BG 5 (five) times daily, to use with insurance preferred 500 strip 3    divalproex ER (DEPAKOTE ER) 250 MG 24 hr tablet Take 1 tablet (250 mg total) by mouth once daily. 90 tablet 4    divalproex ER (DEPAKOTE) 500 MG Tb24 Take 1 tablet by mouth every evening 90 tablet 4    enalapril (VASOTEC) 20 MG tablet Take 1 tablet (20 mg total) by mouth 2 (two) times  "daily. 180 tablet 4    ferrous sulfate 324 mg (65 mg iron) TbEC Take 1 tablet (324 mg total) by mouth every other day. Take on an empty stomach (At least 30 minutes before a meal). 45 tablet 3    FLUoxetine 40 MG capsule Take 1 capsule (40 mg total) by mouth once daily. 90 capsule 1    insulin (LANTUS SOLOSTAR U-100 INSULIN) glargine 100 units/mL (3mL) SubQ pen Inject 20 Units into the skin every evening. 15 mL 6    insulin aspart U-100 (NOVOLOG FLEXPEN U-100 INSULIN) 100 unit/mL (3 mL) InPn pen INJECT 10-15 UNITS three times WITH MEALS 30 mL 6    ketoconazole (NIZORAL) 2 % shampoo Apply topically twice a week. 240 mL 3    lancets Misc To check BG 5 (five) times daily, to use with insurance preferred meter 500 each 3    mupirocin (BACTROBAN) 2 % ointment Apply to wounds twice daily 180 g 3    mupirocin (BACTROBAN) 2 % ointment Apply to wounds twice daily 180 g 3    OLANZapine (ZYPREXA) 2.5 MG tablet Take 1 tablet (2.5 mg total) by mouth 2 (two) times daily. 60 tablet 6    omeprazole (PRILOSEC) 20 MG capsule Take 1 capsule (20 mg total) by mouth once daily. 90 capsule 3    pen needle, diabetic (BD ULTRA-FINE MINI PEN NEEDLE) 31 gauge x 3/16" Ndle USE FOUR TIMES DAILY 100 each 11    rosuvastatin (CRESTOR) 10 MG tablet Take 1 tablet (10 mg total) by mouth every evening. 90 tablet 4    UNKNOWN TO PATIENT holy basil leaf extract 292 mg and holy basil leaf supercritical extract 190 mg) one capsule twice daily.       No current facility-administered medications on file prior to visit.       ALLERGIES: Review of patient's allergies indicates:  No Known Allergies     ROS:       Review of Systems   Constitutional: Negative for diaphoresis, fatigue, fever and unexpected weight change.   HENT:   Negative for lump/mass and sore throat.    Eyes: Negative for icterus.   Respiratory: Negative for cough and shortness of breath.    Cardiovascular: Negative for chest pain and palpitations.   Gastrointestinal: Negative for " abdominal distention, constipation, diarrhea, nausea and vomiting.   Genitourinary: Negative for dysuria and frequency.    Musculoskeletal: Negative for arthralgias, gait problem and myalgias.   Skin: Negative for rash.   Neurological: Negative for dizziness, gait problem and headaches.   Hematological: Negative for adenopathy. Does not bruise/bleed easily.   Psychiatric/Behavioral: The patient is not nervous/anxious.        PHYSICAL EXAM:  Vitals:    06/28/22 1318   BP: 134/87   Pulse: 70   Resp: 17   Temp: 98.7 °F (37.1 °C)   SpO2: 96%   Weight: 75.5 kg (166 lb 7.2 oz)   Height: 6' (1.829 m)   PainSc: 0-No pain     Physical Exam  Constitutional:       General: He is not in acute distress.     Appearance: He is well-developed.   HENT:      Head: Normocephalic and atraumatic.      Mouth/Throat:      Mouth: No oral lesions.   Eyes:      Conjunctiva/sclera: Conjunctivae normal.   Neck:      Thyroid: No thyromegaly.   Cardiovascular:      Rate and Rhythm: Normal rate and regular rhythm.      Heart sounds: Normal heart sounds. No murmur heard.  Pulmonary:      Breath sounds: Normal breath sounds. No wheezing or rales.   Abdominal:      General: There is no distension.      Palpations: Abdomen is soft. There is no hepatomegaly, splenomegaly or mass.      Tenderness: There is no abdominal tenderness.   Lymphadenopathy:      Cervical: No cervical adenopathy.      Right cervical: No deep cervical adenopathy.     Left cervical: No deep cervical adenopathy.   Skin:     Findings: No rash.   Neurological:      Mental Status: He is alert and oriented to person, place, and time.      Cranial Nerves: No cranial nerve deficit.      Coordination: Coordination normal.      Deep Tendon Reflexes: Reflexes are normal and symmetric.         LAB:   Results for orders placed or performed in visit on 06/28/22   CBC auto differential   Result Value Ref Range    WBC 5.31 3.90 - 12.70 K/uL    RBC 3.90 (L) 4.60 - 6.20 M/uL    Hemoglobin 11.2  (L) 14.0 - 18.0 g/dL    Hematocrit 33.8 (L) 40.0 - 54.0 %    MCV 87 82 - 98 fL    MCH 28.7 27.0 - 31.0 pg    MCHC 33.1 32.0 - 36.0 g/dL    RDW 13.4 11.5 - 14.5 %    Platelets 260 150 - 450 K/uL    MPV 11.1 9.2 - 12.9 fL    Immature Granulocytes 0.2 0.0 - 0.5 %    Gran # (ANC) 2.4 1.8 - 7.7 K/uL    Immature Grans (Abs) 0.01 0.00 - 0.04 K/uL    Lymph # 2.1 1.0 - 4.8 K/uL    Mono # 0.5 0.3 - 1.0 K/uL    Eos # 0.3 0.0 - 0.5 K/uL    Baso # 0.02 0.00 - 0.20 K/uL    nRBC 0 0 /100 WBC    Gran % 45.2 38.0 - 73.0 %    Lymph % 40.1 18.0 - 48.0 %    Mono % 9.2 4.0 - 15.0 %    Eosinophil % 4.9 0.0 - 8.0 %    Basophil % 0.4 0.0 - 1.9 %    Differential Method Automated    Comprehensive Metabolic Panel   Result Value Ref Range    Sodium 137 136 - 145 mmol/L    Potassium 4.6 3.5 - 5.1 mmol/L    Chloride 103 95 - 110 mmol/L    CO2 23 23 - 29 mmol/L    Glucose 271 (H) 70 - 110 mg/dL    BUN 15 8 - 23 mg/dL    Creatinine 0.8 0.5 - 1.4 mg/dL    Calcium 8.8 8.7 - 10.5 mg/dL    Total Protein 6.2 6.0 - 8.4 g/dL    Albumin 3.1 (L) 3.5 - 5.2 g/dL    Total Bilirubin 0.4 0.1 - 1.0 mg/dL    Alkaline Phosphatase 83 55 - 135 U/L    AST 18 10 - 40 U/L    ALT 24 10 - 44 U/L    Anion Gap 11 8 - 16 mmol/L    eGFR if African American >60.0 >60 mL/min/1.73 m^2    eGFR if non African American >60.0 >60 mL/min/1.73 m^2   Iron and TIBC   Result Value Ref Range    Iron 73 45 - 160 ug/dL    Transferrin 187 (L) 200 - 375 mg/dL    TIBC 277 250 - 450 ug/dL    Saturated Iron 26 20 - 50 %   Ferritin   Result Value Ref Range    Ferritin 55 20.0 - 300.0 ng/mL   Reticulocytes   Result Value Ref Range    Retic 2.5 (H) 0.4 - 2.0 %       PROBLEMS ASSESSED THIS VISIT:    1. Other iron deficiency anemia        PLAN:       Iron deficiency anemia  Repeat labs today show persistent iron deficiency anemia (with only modest improvement in ferritin) despite adherence to oral iron supplementation. I have therefore recommended intravenous iron in order to achieve resolution of  iron deficiency and improvement in anemia. He is agreeable to this.     Route Chart for Scheduling    BMT Chart Routing      Follow up with physician . Follow-up 1 month after all iron infusions in [facility] clinic.    Follow up with [facility]    Infusion scheduling note Please schedule Injectafer infusions upon authorization (see below)   Injection scheduling note    Labs CBC, CMP, ferritin and iron and TIBC   Lab interval:  Please also include reticulocytes. Schedule labs at time of MD follow-up appt.    Imaging    Pharmacy appointment    Other referrals            Supportive Plan Information  OP FERRIC CARBOXYMALTOSE Q2W   Jose Byrnes MD   Upcoming Treatment Dates - OP FERRIC CARBOXYMALTOSE Q2W    7/5/2022       Supportive Care       ferric carboxymaltose (INJECTAFER) 750 mg in sodium chloride 0.9% 265 mL infusion  7/12/2022       Supportive Care       ferric carboxymaltose (INJECTAFER) 750 mg in sodium chloride 0.9% 265 mL infusion      Jose Byrnes MD  Hematology and Stem Cell Transplant

## 2022-07-07 NOTE — TELEPHONE ENCOUNTER
Called and spoke to caregiver. States she is requesting a hospital bed for safety reasons. States pt is falling out of bed and needs more support. Please advise.

## 2022-07-08 NOTE — TELEPHONE ENCOUNTER
He needs an office visit to be evaluated to get payment for a hospital bed.   Please book him to see Adriane Almaguer with labs prior

## 2022-07-11 ENCOUNTER — TELEPHONE (OUTPATIENT)
Dept: INTERNAL MEDICINE | Facility: CLINIC | Age: 72
End: 2022-07-11
Payer: MEDICARE

## 2022-07-11 NOTE — TELEPHONE ENCOUNTER
Called and spoke to caregiver, Costa. Pt states she cannot take pt in for an appointment until August. Scheduled pt for an appointment 8/11 and labs in July.

## 2022-07-11 NOTE — TELEPHONE ENCOUNTER
----- Message from Edna Ledesma sent at 7/11/2022  9:54 AM CDT -----  Contact: PT -[phone]  Caller:  PT -[phone]    Reason:  regarding missed call from nurse / still waiting hospital bed for home - please leave a message if pt doesn't answer

## 2022-07-19 ENCOUNTER — TELEPHONE (OUTPATIENT)
Dept: HEMATOLOGY/ONCOLOGY | Facility: CLINIC | Age: 72
End: 2022-07-19
Payer: MEDICARE

## 2022-07-19 ENCOUNTER — LAB VISIT (OUTPATIENT)
Dept: LAB | Facility: HOSPITAL | Age: 72
End: 2022-07-19
Attending: INTERNAL MEDICINE
Payer: MEDICARE

## 2022-07-19 DIAGNOSIS — D50.8 OTHER IRON DEFICIENCY ANEMIA: ICD-10-CM

## 2022-07-19 DIAGNOSIS — E55.9 VITAMIN D DEFICIENCY DISEASE: ICD-10-CM

## 2022-07-19 DIAGNOSIS — E10.8 TYPE 1 DIABETES MELLITUS WITH COMPLICATION: ICD-10-CM

## 2022-07-19 LAB
25(OH)D3+25(OH)D2 SERPL-MCNC: 81 NG/ML (ref 30–96)
ALBUMIN SERPL BCP-MCNC: 3.2 G/DL (ref 3.5–5.2)
ALBUMIN SERPL BCP-MCNC: 3.2 G/DL (ref 3.5–5.2)
ALP SERPL-CCNC: 104 U/L (ref 55–135)
ALP SERPL-CCNC: 104 U/L (ref 55–135)
ALT SERPL W/O P-5'-P-CCNC: 41 U/L (ref 10–44)
ALT SERPL W/O P-5'-P-CCNC: 41 U/L (ref 10–44)
ANION GAP SERPL CALC-SCNC: 6 MMOL/L (ref 8–16)
ANION GAP SERPL CALC-SCNC: 6 MMOL/L (ref 8–16)
AST SERPL-CCNC: 23 U/L (ref 10–40)
AST SERPL-CCNC: 23 U/L (ref 10–40)
BASOPHILS # BLD AUTO: 0.03 K/UL (ref 0–0.2)
BASOPHILS # BLD AUTO: 0.03 K/UL (ref 0–0.2)
BASOPHILS NFR BLD: 0.7 % (ref 0–1.9)
BASOPHILS NFR BLD: 0.7 % (ref 0–1.9)
BILIRUB SERPL-MCNC: 0.3 MG/DL (ref 0.1–1)
BILIRUB SERPL-MCNC: 0.3 MG/DL (ref 0.1–1)
BUN SERPL-MCNC: 18 MG/DL (ref 8–23)
BUN SERPL-MCNC: 18 MG/DL (ref 8–23)
CALCIUM SERPL-MCNC: 8.8 MG/DL (ref 8.7–10.5)
CALCIUM SERPL-MCNC: 8.8 MG/DL (ref 8.7–10.5)
CHLORIDE SERPL-SCNC: 99 MMOL/L (ref 95–110)
CHLORIDE SERPL-SCNC: 99 MMOL/L (ref 95–110)
CHOLEST SERPL-MCNC: 137 MG/DL (ref 120–199)
CHOLEST/HDLC SERPL: 2.8 {RATIO} (ref 2–5)
CO2 SERPL-SCNC: 28 MMOL/L (ref 23–29)
CO2 SERPL-SCNC: 28 MMOL/L (ref 23–29)
CREAT SERPL-MCNC: 1 MG/DL (ref 0.5–1.4)
CREAT SERPL-MCNC: 1 MG/DL (ref 0.5–1.4)
DIFFERENTIAL METHOD: ABNORMAL
DIFFERENTIAL METHOD: ABNORMAL
EOSINOPHIL # BLD AUTO: 0.3 K/UL (ref 0–0.5)
EOSINOPHIL # BLD AUTO: 0.3 K/UL (ref 0–0.5)
EOSINOPHIL NFR BLD: 6 % (ref 0–8)
EOSINOPHIL NFR BLD: 6.4 % (ref 0–8)
ERYTHROCYTE [DISTWIDTH] IN BLOOD BY AUTOMATED COUNT: 13.7 % (ref 11.5–14.5)
ERYTHROCYTE [DISTWIDTH] IN BLOOD BY AUTOMATED COUNT: 13.7 % (ref 11.5–14.5)
EST. GFR  (AFRICAN AMERICAN): >60 ML/MIN/1.73 M^2
EST. GFR  (AFRICAN AMERICAN): >60 ML/MIN/1.73 M^2
EST. GFR  (NON AFRICAN AMERICAN): >60 ML/MIN/1.73 M^2
EST. GFR  (NON AFRICAN AMERICAN): >60 ML/MIN/1.73 M^2
ESTIMATED AVG GLUCOSE: 217 MG/DL (ref 68–131)
FERRITIN SERPL-MCNC: 70 NG/ML (ref 20–300)
GLUCOSE SERPL-MCNC: 474 MG/DL (ref 70–110)
GLUCOSE SERPL-MCNC: 474 MG/DL (ref 70–110)
HBA1C MFR BLD: 9.2 % (ref 4–5.6)
HCT VFR BLD AUTO: 35.8 % (ref 40–54)
HCT VFR BLD AUTO: 36.7 % (ref 40–54)
HDLC SERPL-MCNC: 49 MG/DL (ref 40–75)
HDLC SERPL: 35.8 % (ref 20–50)
HGB BLD-MCNC: 11.6 G/DL (ref 14–18)
HGB BLD-MCNC: 11.8 G/DL (ref 14–18)
IMM GRANULOCYTES # BLD AUTO: 0.01 K/UL (ref 0–0.04)
IMM GRANULOCYTES # BLD AUTO: 0.01 K/UL (ref 0–0.04)
IMM GRANULOCYTES NFR BLD AUTO: 0.2 % (ref 0–0.5)
IMM GRANULOCYTES NFR BLD AUTO: 0.2 % (ref 0–0.5)
IRON SERPL-MCNC: 68 UG/DL (ref 45–160)
LDLC SERPL CALC-MCNC: 72.8 MG/DL (ref 63–159)
LYMPHOCYTES # BLD AUTO: 1.6 K/UL (ref 1–4.8)
LYMPHOCYTES # BLD AUTO: 1.6 K/UL (ref 1–4.8)
LYMPHOCYTES NFR BLD: 37.1 % (ref 18–48)
LYMPHOCYTES NFR BLD: 38.1 % (ref 18–48)
MCH RBC QN AUTO: 28.9 PG (ref 27–31)
MCH RBC QN AUTO: 29.5 PG (ref 27–31)
MCHC RBC AUTO-ENTMCNC: 31.6 G/DL (ref 32–36)
MCHC RBC AUTO-ENTMCNC: 33 G/DL (ref 32–36)
MCV RBC AUTO: 90 FL (ref 82–98)
MCV RBC AUTO: 92 FL (ref 82–98)
MONOCYTES # BLD AUTO: 0.5 K/UL (ref 0.3–1)
MONOCYTES # BLD AUTO: 0.5 K/UL (ref 0.3–1)
MONOCYTES NFR BLD: 10.4 % (ref 4–15)
MONOCYTES NFR BLD: 12.2 % (ref 4–15)
NEUTROPHILS # BLD AUTO: 1.8 K/UL (ref 1.8–7.7)
NEUTROPHILS # BLD AUTO: 2 K/UL (ref 1.8–7.7)
NEUTROPHILS NFR BLD: 42.4 % (ref 38–73)
NEUTROPHILS NFR BLD: 45.6 % (ref 38–73)
NONHDLC SERPL-MCNC: 88 MG/DL
NRBC BLD-RTO: 0 /100 WBC
NRBC BLD-RTO: 0 /100 WBC
PLATELET # BLD AUTO: 227 K/UL (ref 150–450)
PLATELET # BLD AUTO: 229 K/UL (ref 150–450)
PMV BLD AUTO: 11.7 FL (ref 9.2–12.9)
PMV BLD AUTO: 12.2 FL (ref 9.2–12.9)
POTASSIUM SERPL-SCNC: 5 MMOL/L (ref 3.5–5.1)
POTASSIUM SERPL-SCNC: 5 MMOL/L (ref 3.5–5.1)
PROT SERPL-MCNC: 6.6 G/DL (ref 6–8.4)
PROT SERPL-MCNC: 6.6 G/DL (ref 6–8.4)
RBC # BLD AUTO: 4 M/UL (ref 4.6–6.2)
RBC # BLD AUTO: 4.01 M/UL (ref 4.6–6.2)
RETICS/RBC NFR AUTO: 2 % (ref 0.4–2)
SATURATED IRON: 22 % (ref 20–50)
SODIUM SERPL-SCNC: 133 MMOL/L (ref 136–145)
SODIUM SERPL-SCNC: 133 MMOL/L (ref 136–145)
T4 FREE SERPL-MCNC: 0.97 NG/DL (ref 0.71–1.51)
TOTAL IRON BINDING CAPACITY: 315 UG/DL (ref 250–450)
TRANSFERRIN SERPL-MCNC: 213 MG/DL (ref 200–375)
TRIGL SERPL-MCNC: 76 MG/DL (ref 30–150)
TSH SERPL DL<=0.005 MIU/L-ACNC: 4.09 UIU/ML (ref 0.4–4)
WBC # BLD AUTO: 4.25 K/UL (ref 3.9–12.7)
WBC # BLD AUTO: 4.31 K/UL (ref 3.9–12.7)

## 2022-07-19 PROCEDURE — 85025 COMPLETE CBC W/AUTO DIFF WBC: CPT | Performed by: INTERNAL MEDICINE

## 2022-07-19 PROCEDURE — 80061 LIPID PANEL: CPT | Performed by: INTERNAL MEDICINE

## 2022-07-19 PROCEDURE — 84466 ASSAY OF TRANSFERRIN: CPT | Performed by: INTERNAL MEDICINE

## 2022-07-19 PROCEDURE — 84443 ASSAY THYROID STIM HORMONE: CPT | Performed by: INTERNAL MEDICINE

## 2022-07-19 PROCEDURE — 85025 COMPLETE CBC W/AUTO DIFF WBC: CPT | Mod: 91 | Performed by: INTERNAL MEDICINE

## 2022-07-19 PROCEDURE — 82306 VITAMIN D 25 HYDROXY: CPT | Performed by: INTERNAL MEDICINE

## 2022-07-19 PROCEDURE — 36415 COLL VENOUS BLD VENIPUNCTURE: CPT | Performed by: INTERNAL MEDICINE

## 2022-07-19 PROCEDURE — 83036 HEMOGLOBIN GLYCOSYLATED A1C: CPT | Performed by: INTERNAL MEDICINE

## 2022-07-19 PROCEDURE — 80053 COMPREHEN METABOLIC PANEL: CPT | Performed by: INTERNAL MEDICINE

## 2022-07-19 PROCEDURE — 84439 ASSAY OF FREE THYROXINE: CPT | Performed by: INTERNAL MEDICINE

## 2022-07-19 PROCEDURE — 85045 AUTOMATED RETICULOCYTE COUNT: CPT | Performed by: INTERNAL MEDICINE

## 2022-07-19 PROCEDURE — 82728 ASSAY OF FERRITIN: CPT | Performed by: INTERNAL MEDICINE

## 2022-07-19 NOTE — TELEPHONE ENCOUNTER
Left message with call back number. Attempted to speak with patient to follow up on current glucose level at home. No answer at this time.

## 2022-07-20 ENCOUNTER — TELEPHONE (OUTPATIENT)
Dept: HEMATOLOGY/ONCOLOGY | Facility: CLINIC | Age: 72
End: 2022-07-20
Payer: MEDICARE

## 2022-07-21 DIAGNOSIS — D50.9 IRON DEFICIENCY ANEMIA, UNSPECIFIED IRON DEFICIENCY ANEMIA TYPE: Primary | ICD-10-CM

## 2022-07-22 ENCOUNTER — PATIENT MESSAGE (OUTPATIENT)
Dept: INTERNAL MEDICINE | Facility: CLINIC | Age: 72
End: 2022-07-22
Payer: MEDICARE

## 2022-07-26 ENCOUNTER — TELEPHONE (OUTPATIENT)
Dept: INTERNAL MEDICINE | Facility: CLINIC | Age: 72
End: 2022-07-26
Payer: MEDICARE

## 2022-07-26 NOTE — TELEPHONE ENCOUNTER
----- Message from Barbara Quiroz sent at 7/26/2022 11:32 AM CDT -----  Contact: [phone]  Pt called to get the results of his labs. He would also like to get a copy of the labs mailed to him and if he does not answer it is okay to leave a message on his voicemail. Please Advise

## 2022-08-02 ENCOUNTER — INFUSION (OUTPATIENT)
Dept: INFUSION THERAPY | Facility: HOSPITAL | Age: 72
End: 2022-08-02
Attending: INTERNAL MEDICINE
Payer: MEDICARE

## 2022-08-02 VITALS
SYSTOLIC BLOOD PRESSURE: 152 MMHG | HEART RATE: 65 BPM | RESPIRATION RATE: 18 BRPM | DIASTOLIC BLOOD PRESSURE: 70 MMHG | TEMPERATURE: 99 F

## 2022-08-02 DIAGNOSIS — D50.8 OTHER IRON DEFICIENCY ANEMIA: Primary | ICD-10-CM

## 2022-08-02 PROCEDURE — 25000003 PHARM REV CODE 250: Performed by: INTERNAL MEDICINE

## 2022-08-02 PROCEDURE — 96365 THER/PROPH/DIAG IV INF INIT: CPT

## 2022-08-02 PROCEDURE — 63600175 PHARM REV CODE 636 W HCPCS: Mod: JG | Performed by: INTERNAL MEDICINE

## 2022-08-02 RX ORDER — HEPARIN 100 UNIT/ML
500 SYRINGE INTRAVENOUS
Status: DISCONTINUED | OUTPATIENT
Start: 2022-08-02 | End: 2022-08-02 | Stop reason: HOSPADM

## 2022-08-02 RX ORDER — SODIUM CHLORIDE 0.9 % (FLUSH) 0.9 %
10 SYRINGE (ML) INJECTION
Status: DISCONTINUED | OUTPATIENT
Start: 2022-08-02 | End: 2022-08-02 | Stop reason: HOSPADM

## 2022-08-02 RX ADMIN — FERRIC CARBOXYMALTOSE INJECTION 750 MG: 50 INJECTION, SOLUTION INTRAVENOUS at 01:08

## 2022-08-02 RX ADMIN — SODIUM CHLORIDE: 0.9 INJECTION, SOLUTION INTRAVENOUS at 01:08

## 2022-08-02 NOTE — PLAN OF CARE
Patient tolerated Injectafer and 30 minute observation with no complications. VSS. Pt instructed to call MD with any problems. NAD. Pt discharged home independently.

## 2022-08-09 ENCOUNTER — TELEPHONE (OUTPATIENT)
Dept: INFUSION THERAPY | Facility: HOSPITAL | Age: 72
End: 2022-08-09
Payer: MEDICARE

## 2022-08-09 NOTE — TELEPHONE ENCOUNTER
Please contact pt to reschedule second injectafer infusion. Pt unable to make appointment today per SHADI Skelton. Please leave vm if there is no answer.

## 2022-08-10 ENCOUNTER — TELEPHONE (OUTPATIENT)
Dept: HEMATOLOGY/ONCOLOGY | Facility: CLINIC | Age: 72
End: 2022-08-10
Payer: MEDICARE

## 2022-08-15 ENCOUNTER — OFFICE VISIT (OUTPATIENT)
Dept: INTERNAL MEDICINE | Facility: CLINIC | Age: 72
End: 2022-08-15
Payer: MEDICARE

## 2022-08-15 VITALS
BODY MASS INDEX: 22.75 KG/M2 | OXYGEN SATURATION: 97 % | SYSTOLIC BLOOD PRESSURE: 120 MMHG | DIASTOLIC BLOOD PRESSURE: 60 MMHG | HEIGHT: 72 IN | HEART RATE: 78 BPM | WEIGHT: 168 LBS

## 2022-08-15 DIAGNOSIS — G25.9 EXTRAPYRAMIDAL MOVEMENT DISORDER: ICD-10-CM

## 2022-08-15 DIAGNOSIS — G40.909 SEIZURE DISORDER: ICD-10-CM

## 2022-08-15 DIAGNOSIS — K21.9 GASTROESOPHAGEAL REFLUX DISEASE, UNSPECIFIED WHETHER ESOPHAGITIS PRESENT: ICD-10-CM

## 2022-08-15 DIAGNOSIS — I10 HYPERTENSION, UNSPECIFIED TYPE: ICD-10-CM

## 2022-08-15 DIAGNOSIS — D50.8 OTHER IRON DEFICIENCY ANEMIA: ICD-10-CM

## 2022-08-15 DIAGNOSIS — Z74.09 LIMITED MOBILITY: ICD-10-CM

## 2022-08-15 DIAGNOSIS — R76.8 HEPATITIS C ANTIBODY POSITIVE IN BLOOD: ICD-10-CM

## 2022-08-15 DIAGNOSIS — F01.50 VASCULAR DEMENTIA, UNCOMPLICATED: Primary | ICD-10-CM

## 2022-08-15 DIAGNOSIS — E10.9 TYPE 1 DIABETES MELLITUS WITHOUT COMPLICATION: ICD-10-CM

## 2022-08-15 PROCEDURE — 99214 OFFICE O/P EST MOD 30 MIN: CPT | Mod: S$PBB,,, | Performed by: PHYSICIAN ASSISTANT

## 2022-08-15 PROCEDURE — 99214 PR OFFICE/OUTPT VISIT, EST, LEVL IV, 30-39 MIN: ICD-10-PCS | Mod: S$PBB,,, | Performed by: PHYSICIAN ASSISTANT

## 2022-08-15 PROCEDURE — 99214 OFFICE O/P EST MOD 30 MIN: CPT | Mod: PBBFAC | Performed by: PHYSICIAN ASSISTANT

## 2022-08-15 PROCEDURE — 99999 PR PBB SHADOW E&M-EST. PATIENT-LVL IV: ICD-10-PCS | Mod: PBBFAC,,, | Performed by: PHYSICIAN ASSISTANT

## 2022-08-15 PROCEDURE — 99999 PR PBB SHADOW E&M-EST. PATIENT-LVL IV: CPT | Mod: PBBFAC,,, | Performed by: PHYSICIAN ASSISTANT

## 2022-08-15 NOTE — PROGRESS NOTES
Subjective:       Patient ID: Layo Haney is a 71 y.o. male.        Chief Complaint: Follow-up (Need evaluation for hospital bed. )    Layo Haney is an established patient of Gemma Boone MD here today for follow up visit.    Here today with caregiver, who gives majority of history, Costa Ledesma ().    Falls out of regular bed at home, 5 times in past year, has hit head in past with this, not recently.  He needs help to get in and out of bed.  Difficult for caregiver to help him.  Bed at home is high up so hard to get in and out of.  Mobility limited.  No ulcers or skin break down.  Has a walker but forgets to use it.    Insomnia - he sleeps well with melatonin      Vascular dementia - he will answer direct questions at times during visit, mood is calm, impaired memory    Movement disorder     Seizure disorder - tx with depakote    Mood - improved on prozac 40 mg daily     HTN - tx with amlodipine and enalapril, BP is 120/60    Lipids - tx with crestor 10 mg    DM - tx with lantus and novolog  Lab Results       Component                Value               Date                       HGBA1C                   9.2 (H)             07/19/2022                 HGBA1C                   9.5 (H)             11/29/2021                 HGBA1C                   9.8 (H)             09/29/2020              GERD - tx with prilosec    PAST MEDICAL HISTORY:   1. Vascular dementia.   2. Type I diabetes.   3. Seizure disorder.   4. History of polysubstance abuse.   5. Recovered alcoholic   6. Bipolar disorder.   7. History of renal insufficiency.   8. Hepatitis C.   9. History of left a left upper extremity DVT January 2007 after an IV.   10. First cervical spine arthritis on C6-C7 on x-ray.   11. Hospitalization July 2008 due to ingestion of caustic substance, which caused erosive esophagitis.    12. History of her aspiration during hospitalization 7/2008.      He and caregiver decline covid booster             Review of Systems   Constitutional: Negative for appetite change, chills, fatigue and fever.   HENT: Negative for congestion and sore throat.    Eyes: Negative for visual disturbance.   Respiratory: Negative for cough, chest tightness and shortness of breath.    Cardiovascular: Negative for chest pain, palpitations and leg swelling.   Gastrointestinal: Negative for abdominal pain, blood in stool, constipation, diarrhea, nausea and vomiting.   Genitourinary: Negative for dysuria, frequency, hematuria and urgency.   Musculoskeletal: Positive for arthralgias. Negative for back pain.   Skin: Negative for rash.   Neurological: Negative for dizziness, syncope, weakness and headaches.        Movement disorder   Psychiatric/Behavioral: Negative for dysphoric mood and sleep disturbance. The patient is not nervous/anxious.        Objective:      Physical Exam  Vitals and nursing note reviewed.   Constitutional:       Appearance: He is well-developed.      Comments: Sitting calmly, answers direct questions but mainly defers to Costa to answer   HENT:      Head: Normocephalic.      Right Ear: External ear normal.      Left Ear: External ear normal.   Eyes:      Pupils: Pupils are equal, round, and reactive to light.   Cardiovascular:      Rate and Rhythm: Normal rate and regular rhythm.      Heart sounds: Normal heart sounds. No murmur heard.    No friction rub. No gallop.   Pulmonary:      Effort: Pulmonary effort is normal. No respiratory distress.      Breath sounds: Normal breath sounds.   Abdominal:      Palpations: Abdomen is soft.      Tenderness: There is no abdominal tenderness.   Skin:     General: Skin is warm and dry.   Neurological:      Mental Status: He is alert.         Assessment:       1. Vascular dementia, uncomplicated    2. Seizure disorder    3. Extrapyramidal movement disorder    4. Hypertension, unspecified type    5. Other iron deficiency anemia    6. Type 1 diabetes mellitus without complication   "  7. Hepatitis C antibody (+) - appears he cleared virus on his own. no need for treatment.    8. Gastroesophageal reflux disease, unspecified whether esophagitis present    9. Limited mobility        Plan:       Layo was seen today for follow-up.    Diagnoses and all orders for this visit:    Vascular dementia, uncomplicated  -     HOSPITAL BED FOR HOME USE    Seizure disorder  -     HOSPITAL BED FOR HOME USE    Extrapyramidal movement disorder  -     HOSPITAL BED FOR HOME USE    Hypertension, unspecified type  -     Comprehensive Metabolic Panel; Future    Other iron deficiency anemia  -     CBC Auto Differential; Future    Type 1 diabetes mellitus without complication  -     Comprehensive Metabolic Panel; Future  -     Hemoglobin A1C; Future    Hepatitis C antibody (+) - appears he cleared virus on his own. no need for treatment.    Gastroesophageal reflux disease, unspecified whether esophagitis present    Limited mobility  -     HOSPITAL BED FOR HOME USE    Hospital bed ordered, [place] [facility]  Labs in 3 months prior to f/u with Dr. Paolo Skelton rescheduled iron infusion    Pt has been given instructions populated from [employer] database and has verbalized understanding of the after visit summary and information contained wherein.    Follow up with a primary care provider. May go to ER for acute shortness of breath, lightheadedness, fever, or any other emergent complaints or changes in condition.    "This note will be shared with the patient"    Future Appointments   Date Time Provider Department Center   8/19/2022  1:00 PM CHAIR 12, [facility] BMT INF [facility] BMT INF [facility] BMT   8/26/2022  5:00 PM NURSE 6, [facility] CHEMO [facility] CHEMO [facility]   9/9/2022 12:40 PM [facility] LAB BMT [facility] LABBMT [facility]   9/9/2022  1:40 PM Jose Byrnes MD [facility]   11/29/2022  2:30 PM Gemma Boone MD [facility]                 "

## 2022-08-17 ENCOUNTER — TELEPHONE (OUTPATIENT)
Dept: INTERNAL MEDICINE | Facility: CLINIC | Age: 72
End: 2022-08-17
Payer: MEDICARE

## 2022-08-17 NOTE — TELEPHONE ENCOUNTER
----- Message from Kimberley Boston sent at 8/17/2022  3:55 PM CDT -----  Contact: [phone]  Pt needs a call back about the visit he had for a hosp bed. Pt has some questions.

## 2022-08-18 ENCOUNTER — TELEPHONE (OUTPATIENT)
Dept: INTERNAL MEDICINE | Facility: CLINIC | Age: 72
End: 2022-08-18
Payer: MEDICARE

## 2022-08-18 DIAGNOSIS — Z74.09 LIMITED MOBILITY: Primary | ICD-10-CM

## 2022-08-18 NOTE — TELEPHONE ENCOUNTER
----- Message from Mikaela Shannon sent at 8/18/2022  4:31 PM CDT -----  Contact: Patient # [phone]  Patient is returning a phone call.  Who left a message for the patient: Gregorio Aburto MA   Does patient know what this is regarding:    Would you like a call back, or a response through your [facility] portal?:   call   Comments:

## 2022-08-19 ENCOUNTER — INFUSION (OUTPATIENT)
Dept: INFUSION THERAPY | Facility: HOSPITAL | Age: 72
End: 2022-08-19
Payer: MEDICARE

## 2022-08-19 VITALS
RESPIRATION RATE: 19 BRPM | HEART RATE: 58 BPM | SYSTOLIC BLOOD PRESSURE: 121 MMHG | TEMPERATURE: 99 F | DIASTOLIC BLOOD PRESSURE: 73 MMHG | OXYGEN SATURATION: 99 %

## 2022-08-19 DIAGNOSIS — D50.8 OTHER IRON DEFICIENCY ANEMIA: Primary | ICD-10-CM

## 2022-08-19 PROCEDURE — 63600175 PHARM REV CODE 636 W HCPCS: Mod: JG | Performed by: INTERNAL MEDICINE

## 2022-08-19 PROCEDURE — 25000003 PHARM REV CODE 250: Performed by: INTERNAL MEDICINE

## 2022-08-19 PROCEDURE — 96365 THER/PROPH/DIAG IV INF INIT: CPT

## 2022-08-19 RX ORDER — SODIUM CHLORIDE 0.9 % (FLUSH) 0.9 %
10 SYRINGE (ML) INJECTION
Status: DISCONTINUED | OUTPATIENT
Start: 2022-08-19 | End: 2022-08-19 | Stop reason: HOSPADM

## 2022-08-19 RX ORDER — HEPARIN 100 UNIT/ML
500 SYRINGE INTRAVENOUS
Status: DISCONTINUED | OUTPATIENT
Start: 2022-08-19 | End: 2022-08-19 | Stop reason: HOSPADM

## 2022-08-19 RX ADMIN — SODIUM CHLORIDE: 9 INJECTION, SOLUTION INTRAVENOUS at 01:08

## 2022-08-19 RX ADMIN — FERRIC CARBOXYMALTOSE INJECTION 750 MG: 50 INJECTION, SOLUTION INTRAVENOUS at 01:08

## 2022-08-19 NOTE — PLAN OF CARE
Pt ambulatory to clinic with caregiver for Injectafer infusion. Denies any sig complaints. PIV started without difficulty. Pt tolerated infusion well. OBS for 30 minutes post infusion, no adverse reaction noted. PIV removed with catheter intact. Ambulatory from clinic with caregiver in NAD.

## 2022-09-09 ENCOUNTER — OFFICE VISIT (OUTPATIENT)
Dept: HEMATOLOGY/ONCOLOGY | Facility: CLINIC | Age: 72
End: 2022-09-09
Payer: MEDICARE

## 2022-09-09 ENCOUNTER — LAB VISIT (OUTPATIENT)
Dept: LAB | Facility: HOSPITAL | Age: 72
End: 2022-09-09
Payer: MEDICARE

## 2022-09-09 VITALS
OXYGEN SATURATION: 97 % | HEART RATE: 79 BPM | DIASTOLIC BLOOD PRESSURE: 65 MMHG | HEIGHT: 72 IN | TEMPERATURE: 99 F | SYSTOLIC BLOOD PRESSURE: 145 MMHG | BODY MASS INDEX: 22.53 KG/M2 | WEIGHT: 166.31 LBS | RESPIRATION RATE: 18 BRPM

## 2022-09-09 DIAGNOSIS — D50.9 IRON DEFICIENCY ANEMIA, UNSPECIFIED IRON DEFICIENCY ANEMIA TYPE: Primary | ICD-10-CM

## 2022-09-09 DIAGNOSIS — D50.9 IRON DEFICIENCY ANEMIA, UNSPECIFIED IRON DEFICIENCY ANEMIA TYPE: ICD-10-CM

## 2022-09-09 DIAGNOSIS — D64.9 ANEMIA, UNSPECIFIED TYPE: Primary | ICD-10-CM

## 2022-09-09 DIAGNOSIS — D64.9 ANEMIA, UNSPECIFIED TYPE: ICD-10-CM

## 2022-09-09 LAB
ALBUMIN SERPL BCP-MCNC: 3.3 G/DL (ref 3.5–5.2)
ALP SERPL-CCNC: 83 U/L (ref 55–135)
ALT SERPL W/O P-5'-P-CCNC: 38 U/L (ref 10–44)
ANION GAP SERPL CALC-SCNC: 11 MMOL/L (ref 8–16)
AST SERPL-CCNC: 20 U/L (ref 10–40)
BASOPHILS # BLD AUTO: 0.02 K/UL (ref 0–0.2)
BASOPHILS NFR BLD: 0.3 % (ref 0–1.9)
BILIRUB SERPL-MCNC: 0.5 MG/DL (ref 0.1–1)
BUN SERPL-MCNC: 13 MG/DL (ref 8–23)
CALCIUM SERPL-MCNC: 8.6 MG/DL (ref 8.7–10.5)
CHLORIDE SERPL-SCNC: 105 MMOL/L (ref 95–110)
CO2 SERPL-SCNC: 23 MMOL/L (ref 23–29)
CREAT SERPL-MCNC: 0.8 MG/DL (ref 0.5–1.4)
DIFFERENTIAL METHOD: ABNORMAL
EOSINOPHIL # BLD AUTO: 0.1 K/UL (ref 0–0.5)
EOSINOPHIL NFR BLD: 1.5 % (ref 0–8)
ERYTHROCYTE [DISTWIDTH] IN BLOOD BY AUTOMATED COUNT: 14.6 % (ref 11.5–14.5)
EST. GFR  (NO RACE VARIABLE): >60 ML/MIN/1.73 M^2
FERRITIN SERPL-MCNC: 1569 NG/ML (ref 20–300)
GLUCOSE SERPL-MCNC: 280 MG/DL (ref 70–110)
HCT VFR BLD AUTO: 35.7 % (ref 40–54)
HGB BLD-MCNC: 12 G/DL (ref 14–18)
IMM GRANULOCYTES # BLD AUTO: 0.04 K/UL (ref 0–0.04)
IMM GRANULOCYTES NFR BLD AUTO: 0.6 % (ref 0–0.5)
IRON SERPL-MCNC: 32 UG/DL (ref 45–160)
LYMPHOCYTES # BLD AUTO: 1.7 K/UL (ref 1–4.8)
LYMPHOCYTES NFR BLD: 25.8 % (ref 18–48)
MCH RBC QN AUTO: 30.2 PG (ref 27–31)
MCHC RBC AUTO-ENTMCNC: 33.6 G/DL (ref 32–36)
MCV RBC AUTO: 90 FL (ref 82–98)
MONOCYTES # BLD AUTO: 0.8 K/UL (ref 0.3–1)
MONOCYTES NFR BLD: 12 % (ref 4–15)
NEUTROPHILS # BLD AUTO: 3.9 K/UL (ref 1.8–7.7)
NEUTROPHILS NFR BLD: 59.8 % (ref 38–73)
NRBC BLD-RTO: 0 /100 WBC
PLATELET # BLD AUTO: 219 K/UL (ref 150–450)
PMV BLD AUTO: 11.3 FL (ref 9.2–12.9)
POTASSIUM SERPL-SCNC: 4.3 MMOL/L (ref 3.5–5.1)
PROT SERPL-MCNC: 7.2 G/DL (ref 6–8.4)
RBC # BLD AUTO: 3.98 M/UL (ref 4.6–6.2)
RETICS/RBC NFR AUTO: 2.4 % (ref 0.4–2)
SATURATED IRON: 15 % (ref 20–50)
SODIUM SERPL-SCNC: 139 MMOL/L (ref 136–145)
TOTAL IRON BINDING CAPACITY: 219 UG/DL (ref 250–450)
TRANSFERRIN SERPL-MCNC: 148 MG/DL (ref 200–375)
WBC # BLD AUTO: 6.48 K/UL (ref 3.9–12.7)

## 2022-09-09 PROCEDURE — 85025 COMPLETE CBC W/AUTO DIFF WBC: CPT | Performed by: INTERNAL MEDICINE

## 2022-09-09 PROCEDURE — 99999 PR PBB SHADOW E&M-EST. PATIENT-LVL IV: CPT | Mod: PBBFAC,,, | Performed by: INTERNAL MEDICINE

## 2022-09-09 PROCEDURE — 99999 PR PBB SHADOW E&M-EST. PATIENT-LVL IV: ICD-10-PCS | Mod: PBBFAC,,, | Performed by: INTERNAL MEDICINE

## 2022-09-09 PROCEDURE — 84466 ASSAY OF TRANSFERRIN: CPT | Performed by: INTERNAL MEDICINE

## 2022-09-09 PROCEDURE — 36415 COLL VENOUS BLD VENIPUNCTURE: CPT | Performed by: INTERNAL MEDICINE

## 2022-09-09 PROCEDURE — 99213 OFFICE O/P EST LOW 20 MIN: CPT | Mod: S$PBB,,, | Performed by: INTERNAL MEDICINE

## 2022-09-09 PROCEDURE — 82728 ASSAY OF FERRITIN: CPT | Performed by: INTERNAL MEDICINE

## 2022-09-09 PROCEDURE — 99214 OFFICE O/P EST MOD 30 MIN: CPT | Mod: PBBFAC | Performed by: INTERNAL MEDICINE

## 2022-09-09 PROCEDURE — 99213 PR OFFICE/OUTPT VISIT, EST, LEVL III, 20-29 MIN: ICD-10-PCS | Mod: S$PBB,,, | Performed by: INTERNAL MEDICINE

## 2022-09-09 PROCEDURE — 85045 AUTOMATED RETICULOCYTE COUNT: CPT | Performed by: INTERNAL MEDICINE

## 2022-09-09 PROCEDURE — 80053 COMPREHEN METABOLIC PANEL: CPT | Performed by: INTERNAL MEDICINE

## 2022-09-09 NOTE — PROGRESS NOTES
Route Chart for Scheduling    BMT Chart Routing      Follow up with physician No follow up needed.   Follow up with RAIS[facility]    Infusion scheduling note    Injection scheduling note    Labs CBC, CMP, ferritin and iron and TIBC   Lab interval:  Please also include reticulocytes. schedule labs week of 11/21 in primary care building (he sees PCP on 11/29).   Imaging    Pharmacy appointment    Other referrals

## 2022-09-15 NOTE — PROGRESS NOTES
HEMATOLOGY PROGRESS NOTE    IDENTIFYING STATEMENT   Layo Arango) is a 72 y.o. male with a  of 1950 from [place] with the diagnosis of iron deficiency anemia.      HEMATOLOGY HISTORY:    1. Iron deficiency anemia     2. Vascular dementia  3. Seizure disorder  4. Extrapyramidal movement disorder  5. Bipolar disorder  6. Diabetes mellitus, type 1, complicated by retinopathy  7. Hypertension  8. History of Hepatitis C  9. Gastroesophageal reflux disease     INTERVAL HISTORY:      Mr. Haney returns to clinic for follow-up of iron deficiency anemia. He has now completed iron infusions without event. He does not feel substantially different. He tolerated infusions well.     Past Medical History, Past Social History and Past Family History have been reviewed and are unchanged except as noted in the interval history.    MEDICATIONS:     Current Outpatient Medications on File Prior to Visit   Medication Sig Dispense Refill    amLODIPine (NORVASC) 10 MG tablet Take 1 tablet (10 mg total) by mouth once daily. 90 tablet 4    ASHWAGANDHA ROOT EXTRACT,BULK, MISC by Misc.(Non-Drug; Combo Route) route. orgainic ashwagandha 380 mg plus ashwagandha extract 95 mg - one capsule twice daily      benztropine (COGENTIN) 0.5 MG tablet Take 1 tablet (0.5 mg total) by mouth 2 (two) times daily. 180 tablet 4    blood sugar diagnostic (CONTOUR NEXT TEST STRIPS) Strp To check BG 5 (five) times daily, to use with insurance preferred 500 strip 3    divalproex ER (DEPAKOTE ER) 250 MG 24 hr tablet Take 1 tablet (250 mg total) by mouth once daily. 90 tablet 4    divalproex ER (DEPAKOTE) 500 MG Tb24 Take 1 tablet by mouth every evening 90 tablet 4    enalapril (VASOTEC) 20 MG tablet Take 1 tablet (20 mg total) by mouth 2 (two) times daily. 180 tablet 4    ferrous sulfate 324 mg (65 mg iron) TbEC Take 1 tablet (324 mg total) by mouth every other day. Take on an empty stomach (At least 30 minutes before a meal). 45 tablet 3     "FLUoxetine 40 MG capsule Take 1 capsule (40 mg total) by mouth once daily. 90 capsule 1    insulin (LANTUS SOLOSTAR U-100 INSULIN) glargine 100 units/mL (3mL) SubQ pen Inject 20 Units into the skin every evening. 15 mL 6    insulin aspart U-100 (NOVOLOG FLEXPEN U-100 INSULIN) 100 unit/mL (3 mL) InPn pen INJECT 10-15 UNITS three times WITH MEALS 30 mL 6    ketoconazole (NIZORAL) 2 % shampoo Apply topically twice a week. 240 mL 3    lancets Misc To check BG 5 (five) times daily, to use with insurance preferred meter 500 each 3    mupirocin (BACTROBAN) 2 % ointment Apply to wounds twice daily 180 g 3    mupirocin (BACTROBAN) 2 % ointment Apply to wounds twice daily 180 g 3    OLANZapine (ZYPREXA) 2.5 MG tablet Take 1 tablet (2.5 mg total) by mouth 2 (two) times daily. 60 tablet 6    omeprazole (PRILOSEC) 20 MG capsule Take 1 capsule (20 mg total) by mouth once daily. 90 capsule 3    pen needle, diabetic (BD ULTRA-FINE MINI PEN NEEDLE) 31 gauge x 3/16" Ndle USE FOUR TIMES DAILY 100 each 11    rosuvastatin (CRESTOR) 10 MG tablet Take 1 tablet (10 mg total) by mouth every evening. 90 tablet 4    UNKNOWN TO PATIENT holy basil leaf extract 292 mg and holy basil leaf supercritical extract 190 mg) one capsule twice daily.      acetaminophen (TYLENOL) 500 MG tablet Take 2 tablets (1,000 mg total) by mouth every 8 (eight) hours as needed for Pain. (Patient not taking: No sig reported) 100 tablet 3     No current facility-administered medications on file prior to visit.       ALLERGIES: Review of patient's allergies indicates:  No Known Allergies     ROS:       Review of Systems   Constitutional:  Negative for diaphoresis, fatigue, fever and unexpected weight change.   HENT:   Negative for lump/mass and sore throat.    Eyes:  Negative for icterus.   Respiratory:  Negative for cough and shortness of breath.    Cardiovascular:  Negative for chest pain and palpitations.   Gastrointestinal:  Negative for abdominal distention, " constipation, diarrhea, nausea and vomiting.   Genitourinary:  Negative for dysuria and frequency.    Musculoskeletal:  Negative for arthralgias, gait problem and myalgias.   Skin:  Negative for rash.   Neurological:  Negative for dizziness, gait problem and headaches.   Hematological:  Negative for adenopathy. Does not bruise/bleed easily.   Psychiatric/Behavioral:  The patient is not nervous/anxious.      PHYSICAL EXAM:  Vitals:    09/09/22 1338   BP: (!) 145/65   Pulse: 79   Resp: 18   Temp: 99 °F (37.2 °C)   TempSrc: Oral   SpO2: 97%   Weight: 75.4 kg (166 lb 5.4 oz)   Height: 6' (1.829 m)   PainSc: 0-No pain     Physical Exam  Constitutional:       General: He is not in acute distress.     Appearance: He is well-developed.   HENT:      Head: Normocephalic and atraumatic.      Mouth/Throat:      Mouth: No oral lesions.   Eyes:      Conjunctiva/sclera: Conjunctivae normal.   Neck:      Thyroid: No thyromegaly.   Cardiovascular:      Rate and Rhythm: Normal rate and regular rhythm.      Heart sounds: Normal heart sounds. No murmur heard.  Pulmonary:      Breath sounds: Normal breath sounds. No wheezing or rales.   Abdominal:      General: There is no distension.      Palpations: Abdomen is soft. There is no hepatomegaly, splenomegaly or mass.      Tenderness: There is no abdominal tenderness.   Lymphadenopathy:      Cervical: No cervical adenopathy.      Right cervical: No deep cervical adenopathy.     Left cervical: No deep cervical adenopathy.   Skin:     Findings: No rash.   Neurological:      Mental Status: He is alert and oriented to person, place, and time.      Cranial Nerves: No cranial nerve deficit.      Coordination: Coordination normal.      Deep Tendon Reflexes: Reflexes are normal and symmetric.       LAB:   Results for orders placed or performed in visit on 09/09/22   CBC w/ DIFF   Result Value Ref Range    WBC 6.48 3.90 - 12.70 K/uL    RBC 3.98 (L) 4.60 - 6.20 M/uL    Hemoglobin 12.0 (L) 14.0 -  18.0 g/dL    Hematocrit 35.7 (L) 40.0 - 54.0 %    MCV 90 82 - 98 fL    MCH 30.2 27.0 - 31.0 pg    MCHC 33.6 32.0 - 36.0 g/dL    RDW 14.6 (H) 11.5 - 14.5 %    Platelets 219 150 - 450 K/uL    MPV 11.3 9.2 - 12.9 fL    Immature Granulocytes 0.6 (H) 0.0 - 0.5 %    Gran # (ANC) 3.9 1.8 - 7.7 K/uL    Immature Grans (Abs) 0.04 0.00 - 0.04 K/uL    Lymph # 1.7 1.0 - 4.8 K/uL    Mono # 0.8 0.3 - 1.0 K/uL    Eos # 0.1 0.0 - 0.5 K/uL    Baso # 0.02 0.00 - 0.20 K/uL    nRBC 0 0 /100 WBC    Gran % 59.8 38.0 - 73.0 %    Lymph % 25.8 18.0 - 48.0 %    Mono % 12.0 4.0 - 15.0 %    Eosinophil % 1.5 0.0 - 8.0 %    Basophil % 0.3 0.0 - 1.9 %    Differential Method Automated    CMP   Result Value Ref Range    Sodium 139 136 - 145 mmol/L    Potassium 4.3 3.5 - 5.1 mmol/L    Chloride 105 95 - 110 mmol/L    CO2 23 23 - 29 mmol/L    Glucose 280 (H) 70 - 110 mg/dL    BUN 13 8 - 23 mg/dL    Creatinine 0.8 0.5 - 1.4 mg/dL    Calcium 8.6 (L) 8.7 - 10.5 mg/dL    Total Protein 7.2 6.0 - 8.4 g/dL    Albumin 3.3 (L) 3.5 - 5.2 g/dL    Total Bilirubin 0.5 0.1 - 1.0 mg/dL    Alkaline Phosphatase 83 55 - 135 U/L    AST 20 10 - 40 U/L    ALT 38 10 - 44 U/L    Anion Gap 11 8 - 16 mmol/L    eGFR >60.0 >60 mL/min/1.73 m^2   Iron and TIBC   Result Value Ref Range    Iron 32 (L) 45 - 160 ug/dL    Transferrin 148 (L) 200 - 375 mg/dL    TIBC 219 (L) 250 - 450 ug/dL    Saturated Iron 15 (L) 20 - 50 %   Ferritin   Result Value Ref Range    Ferritin 1,569 (H) 20.0 - 300.0 ng/mL   Reticulocytes   Result Value Ref Range    Retic 2.4 (H) 0.4 - 2.0 %       PROBLEMS ASSESSED THIS VISIT:    1. Iron deficiency anemia, unspecified iron deficiency anemia type        PLAN:       Iron deficiency anemia  Repeat labs today show resolution of iron deficiency. He has mild anemia and iron studies are consistent with inflammatory disease. Suspect his ongoing anemia is related to chronic inflammation.     At this time, he does not need additional iron. Recommend evaluation and  management of any underlying causes of inflammation by PCP.     We will obtain follow-up labs in 3 months to ensure he does not become iron deficient again.     Follow-up  Labs in 3 months  Return to PCP for ongoing care    Jose Byrnes MD  Hematology and Stem Cell Transplant

## 2022-11-21 ENCOUNTER — LAB VISIT (OUTPATIENT)
Dept: LAB | Facility: HOSPITAL | Age: 72
End: 2022-11-21
Payer: MEDICARE

## 2022-11-21 DIAGNOSIS — D50.9 IRON DEFICIENCY ANEMIA, UNSPECIFIED IRON DEFICIENCY ANEMIA TYPE: ICD-10-CM

## 2022-11-21 LAB
ALBUMIN SERPL BCP-MCNC: 3.5 G/DL (ref 3.5–5.2)
ALP SERPL-CCNC: 93 U/L (ref 55–135)
ALT SERPL W/O P-5'-P-CCNC: 35 U/L (ref 10–44)
ANION GAP SERPL CALC-SCNC: 9 MMOL/L (ref 8–16)
AST SERPL-CCNC: 24 U/L (ref 10–40)
BASOPHILS # BLD AUTO: 0.03 K/UL (ref 0–0.2)
BASOPHILS NFR BLD: 0.6 % (ref 0–1.9)
BILIRUB SERPL-MCNC: 0.3 MG/DL (ref 0.1–1)
BUN SERPL-MCNC: 14 MG/DL (ref 8–23)
CALCIUM SERPL-MCNC: 9.3 MG/DL (ref 8.7–10.5)
CHLORIDE SERPL-SCNC: 105 MMOL/L (ref 95–110)
CO2 SERPL-SCNC: 28 MMOL/L (ref 23–29)
CREAT SERPL-MCNC: 0.7 MG/DL (ref 0.5–1.4)
DIFFERENTIAL METHOD: ABNORMAL
EOSINOPHIL # BLD AUTO: 0.2 K/UL (ref 0–0.5)
EOSINOPHIL NFR BLD: 4.5 % (ref 0–8)
ERYTHROCYTE [DISTWIDTH] IN BLOOD BY AUTOMATED COUNT: 13 % (ref 11.5–14.5)
EST. GFR  (NO RACE VARIABLE): >60 ML/MIN/1.73 M^2
FERRITIN SERPL-MCNC: 680 NG/ML (ref 20–300)
GLUCOSE SERPL-MCNC: 88 MG/DL (ref 70–110)
HCT VFR BLD AUTO: 37.8 % (ref 40–54)
HGB BLD-MCNC: 12.4 G/DL (ref 14–18)
IMM GRANULOCYTES # BLD AUTO: 0.01 K/UL (ref 0–0.04)
IMM GRANULOCYTES NFR BLD AUTO: 0.2 % (ref 0–0.5)
IRON SERPL-MCNC: 103 UG/DL (ref 45–160)
LYMPHOCYTES # BLD AUTO: 2.1 K/UL (ref 1–4.8)
LYMPHOCYTES NFR BLD: 39.4 % (ref 18–48)
MCH RBC QN AUTO: 30.8 PG (ref 27–31)
MCHC RBC AUTO-ENTMCNC: 32.8 G/DL (ref 32–36)
MCV RBC AUTO: 94 FL (ref 82–98)
MONOCYTES # BLD AUTO: 0.7 K/UL (ref 0.3–1)
MONOCYTES NFR BLD: 12.7 % (ref 4–15)
NEUTROPHILS # BLD AUTO: 2.3 K/UL (ref 1.8–7.7)
NEUTROPHILS NFR BLD: 42.6 % (ref 38–73)
NRBC BLD-RTO: 0 /100 WBC
PLATELET # BLD AUTO: 230 K/UL (ref 150–450)
PMV BLD AUTO: 11.6 FL (ref 9.2–12.9)
POTASSIUM SERPL-SCNC: 4.2 MMOL/L (ref 3.5–5.1)
PROT SERPL-MCNC: 7.1 G/DL (ref 6–8.4)
RBC # BLD AUTO: 4.02 M/UL (ref 4.6–6.2)
RETICS/RBC NFR AUTO: 1.8 % (ref 0.4–2)
SATURATED IRON: 39 % (ref 20–50)
SODIUM SERPL-SCNC: 142 MMOL/L (ref 136–145)
TOTAL IRON BINDING CAPACITY: 266 UG/DL (ref 250–450)
TRANSFERRIN SERPL-MCNC: 180 MG/DL (ref 200–375)
WBC # BLD AUTO: 5.28 K/UL (ref 3.9–12.7)

## 2022-11-21 PROCEDURE — 85025 COMPLETE CBC W/AUTO DIFF WBC: CPT | Performed by: INTERNAL MEDICINE

## 2022-11-21 PROCEDURE — 84466 ASSAY OF TRANSFERRIN: CPT | Performed by: INTERNAL MEDICINE

## 2022-11-21 PROCEDURE — 85045 AUTOMATED RETICULOCYTE COUNT: CPT | Performed by: INTERNAL MEDICINE

## 2022-11-21 PROCEDURE — 82728 ASSAY OF FERRITIN: CPT | Performed by: INTERNAL MEDICINE

## 2022-11-21 PROCEDURE — 80053 COMPREHEN METABOLIC PANEL: CPT | Performed by: INTERNAL MEDICINE

## 2022-12-01 ENCOUNTER — OFFICE VISIT (OUTPATIENT)
Dept: INTERNAL MEDICINE | Facility: CLINIC | Age: 72
End: 2022-12-01
Payer: MEDICARE

## 2022-12-01 ENCOUNTER — LAB VISIT (OUTPATIENT)
Dept: LAB | Facility: HOSPITAL | Age: 72
End: 2022-12-01
Attending: INTERNAL MEDICINE
Payer: MEDICARE

## 2022-12-01 VITALS
OXYGEN SATURATION: 100 % | DIASTOLIC BLOOD PRESSURE: 64 MMHG | SYSTOLIC BLOOD PRESSURE: 114 MMHG | WEIGHT: 162.5 LBS | HEIGHT: 72 IN | HEART RATE: 65 BPM | BODY MASS INDEX: 22.01 KG/M2

## 2022-12-01 DIAGNOSIS — E10.8 TYPE 1 DIABETES MELLITUS WITH COMPLICATION: ICD-10-CM

## 2022-12-01 DIAGNOSIS — J43.9 PULMONARY EMPHYSEMA, UNSPECIFIED EMPHYSEMA TYPE: ICD-10-CM

## 2022-12-01 DIAGNOSIS — G25.9 EXTRAPYRAMIDAL MOVEMENT DISORDER: ICD-10-CM

## 2022-12-01 DIAGNOSIS — I10 HYPERTENSION, UNSPECIFIED TYPE: ICD-10-CM

## 2022-12-01 DIAGNOSIS — Z00.00 ROUTINE GENERAL MEDICAL EXAMINATION AT A HEALTH CARE FACILITY: Primary | ICD-10-CM

## 2022-12-01 DIAGNOSIS — G40.909 SEIZURE DISORDER: ICD-10-CM

## 2022-12-01 PROCEDURE — 99213 OFFICE O/P EST LOW 20 MIN: CPT | Mod: PBBFAC | Performed by: INTERNAL MEDICINE

## 2022-12-01 PROCEDURE — 99397 PER PM REEVAL EST PAT 65+ YR: CPT | Mod: S$PBB,GZ,, | Performed by: INTERNAL MEDICINE

## 2022-12-01 PROCEDURE — 83036 HEMOGLOBIN GLYCOSYLATED A1C: CPT | Performed by: INTERNAL MEDICINE

## 2022-12-01 PROCEDURE — 36415 COLL VENOUS BLD VENIPUNCTURE: CPT | Performed by: INTERNAL MEDICINE

## 2022-12-01 PROCEDURE — 99397 PR PREVENTIVE VISIT,EST,65 & OVER: ICD-10-PCS | Mod: S$PBB,GZ,, | Performed by: INTERNAL MEDICINE

## 2022-12-01 PROCEDURE — 99999 PR PBB SHADOW E&M-EST. PATIENT-LVL III: CPT | Mod: PBBFAC,,, | Performed by: INTERNAL MEDICINE

## 2022-12-01 PROCEDURE — 99999 PR PBB SHADOW E&M-EST. PATIENT-LVL III: ICD-10-PCS | Mod: PBBFAC,,, | Performed by: INTERNAL MEDICINE

## 2022-12-01 RX ORDER — BENZTROPINE MESYLATE 0.5 MG/1
0.5 TABLET ORAL 2 TIMES DAILY
Qty: 180 TABLET | Refills: 4 | Status: SHIPPED | OUTPATIENT
Start: 2022-12-01 | End: 2024-01-23

## 2022-12-01 RX ORDER — DIVALPROEX SODIUM 500 MG/1
TABLET, FILM COATED, EXTENDED RELEASE ORAL
Qty: 90 TABLET | Refills: 4 | Status: SHIPPED | OUTPATIENT
Start: 2022-12-01 | End: 2024-01-23

## 2022-12-01 RX ORDER — AMLODIPINE BESYLATE 10 MG/1
10 TABLET ORAL DAILY
Qty: 90 TABLET | Refills: 4 | Status: SHIPPED | OUTPATIENT
Start: 2022-12-01 | End: 2024-01-23

## 2022-12-01 RX ORDER — INSULIN GLARGINE 100 [IU]/ML
20 INJECTION, SOLUTION SUBCUTANEOUS DAILY
Qty: 15 ML | Refills: 11 | Status: SHIPPED | OUTPATIENT
Start: 2022-12-01 | End: 2023-12-22

## 2022-12-01 RX ORDER — ROSUVASTATIN CALCIUM 10 MG/1
10 TABLET, COATED ORAL NIGHTLY
Qty: 90 TABLET | Refills: 4 | Status: SHIPPED | OUTPATIENT
Start: 2022-12-01 | End: 2024-01-23

## 2022-12-01 RX ORDER — OMEPRAZOLE 20 MG/1
20 CAPSULE, DELAYED RELEASE ORAL DAILY
Qty: 90 CAPSULE | Refills: 4 | Status: SHIPPED | OUTPATIENT
Start: 2022-12-01 | End: 2024-01-23

## 2022-12-01 RX ORDER — INSULIN ASPART 100 [IU]/ML
INJECTION, SOLUTION INTRAVENOUS; SUBCUTANEOUS
Qty: 30 ML | Refills: 6 | Status: SHIPPED | OUTPATIENT
Start: 2022-12-01

## 2022-12-01 RX ORDER — FLUOXETINE HYDROCHLORIDE 40 MG/1
40 CAPSULE ORAL DAILY
Qty: 90 CAPSULE | Refills: 4 | Status: SHIPPED | OUTPATIENT
Start: 2022-12-01 | End: 2022-12-28

## 2022-12-01 RX ORDER — ENALAPRIL MALEATE 20 MG/1
20 TABLET ORAL 2 TIMES DAILY
Qty: 180 TABLET | Refills: 4 | Status: SHIPPED | OUTPATIENT
Start: 2022-12-01 | End: 2024-01-23

## 2022-12-01 RX ORDER — DIVALPROEX SODIUM 250 MG/1
250 TABLET, FILM COATED, EXTENDED RELEASE ORAL DAILY
Qty: 90 TABLET | Refills: 4 | Status: SHIPPED | OUTPATIENT
Start: 2022-12-01 | End: 2024-01-23

## 2022-12-01 NOTE — PROGRESS NOTES
CHIEF COMPLAINT: Annual exam    HISTORY OF PRESENT ILLNESS: 72-year-old man presents for an annual exam.      He has been doing well. He lives with his Costa Ledesma ([phone]), who helps him with his medications and meals. Most of the history is obtained from Costa due to his dementia. He is happy living with Costa. He is eating well.       HE is now taking ashwagandha (orgainic ashwagandha 380 mg plus ashwagandha extract 95 mg) one capsule once daily and Holy Basil Leaf (holy basil leaf extract 292 mg and holy basil leaf supercritical extract 190 mg) one capsule once daily.  HE is also taking Melatonin 10 mg 2  times daily which helps calm him. He is sleeping well. He has less movement of arms, legs, tongue.  he is sitting still and can hold a short conversation.   .  Mood is better on the supplements. HE is calmer.  Disposition is ok.  Memory is about 5 minutes Swallowing is better. No chocking.  He no longer worries about things.   HE has a sense of humor.  He saw neurology 5/21/18 who recommened Zyprexa 2.5 mg twice daily which made things worse. HE was hearing voices so Costa stopped Zyprexa medication. He is forgetting to drink his water.  Costa has to remind him to drink water.      HE is now taking prozac 40 mg daily, depakote 250 mg in am and 500 mg in the evening and cogentin 0.5 mg twice daily for his mood and seizure disorder. Mood is good He has not had any seizures.  He is not paranoid.  He sleeps well.  No depression.      He is currently taking Lantus 20 units in the evening and Novolog 5-10  units plus sliding scale before meals. Blood sugars have been better 120-150.   No fever, chills, nausea, voimiting, constipation, diarrhea. No significant hypoglycemia     No dysuria, hematuria, nocturia, straining or incomplete void, chest pain, shortness of breath, rhinorrhea, headache, blurred vision, hearing loss. He continues to take Crestor 10 mg daily for his hyperlipidemia. He denies any joint pain,  muscle pain. He continues to take enalapril 20 mg twice daily and Norvasc 10 mg daily for his hypertension. He is on omeprazole 20 mg once daily for reflux and has not had any reflux symptoms.              REVIEW OF SYSTEMS: She denies fevers, chills, night sweats, fatigue, visual change, hearing loss, sinus congestion, sore throat, chest pain, shortness of breath, nausea, vomiting, constipation, diarrhea, dysuria, hematuria, polydipsia, polyuria, joint pain, muscle pain, headaches.        PAST MEDICAL HISTORY:   1. Vascular dementia.   2. Type I diabetes.   3. Seizure disorder.   4. History of polysubstance abuse.   5. Recovered alcoholic   6. Bipolar disorder.   7. History of renal insufficiency.   8. Hepatitis C.   9. History of left a left upper extremity DVT January 2007 after an IV.   10. First cervical spine arthritis on C6-C7 on x-ray.   11. Hospitalization July 2008 due to ingestion of caustic substance, which caused erosive esophagitis.    12. History of her aspiration during hospitalization 7/2008.     MEDICATIONS and ALLERGIES: Updated on epic.     PHYSICAL EXAMINATION:    /64 (BP Location: Right arm, Patient Position: Sitting)   Pulse 65   Ht 6' (1.829 m)   Wt 73.7 kg (162 lb 7.7 oz)   SpO2 100%   BMI 22.04 kg/m²     General: Alert, oriented. No apparent distress. Affect within normal limits.   Conjunctivae anicteric. Tympanic membranes clear. Oropharynx clear. Poor dentition  Neck supple.   Respiratory effort normal. Lungs clear bilaterally. Good air flow  Heart: Regular rate and rhythm without murmurs, gallops or rubs.   No lower extremity edema.   Abdomen: Soft, nondistended, nontender. Bowel sounds present. No   hepatosplenomegaly.      Protective Sensation (w/ 10 gram monofilament):  Right: Decreased  Left: Decreased    Visual Inspection:  Onychomycosis -  Bilateral    Pedal Pulses:   Right: Present  Left: Present    Posterior tibialis:   Right:Present  Left: Present               ASSESSMENT AND PLAN:    Annual exam  1. Type I diabetes labs today  2. Hypertension - controlled.   3. Hyperlipidemia - controlled  4. Hepatitis C -saw hepatology 10/2018. Hepatitis C RNA negative.   5. Bipolar disorder 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 Seizure disorder - asx  Screening - . Pt declines colonscopy. Cologuard  10. Movement disorder - better with supplements  11. Cannot do colonoscopy prep  I'll see him back in 4 months, sooner if problems arise

## 2022-12-02 LAB
ESTIMATED AVG GLUCOSE: 192 MG/DL (ref 68–131)
HBA1C MFR BLD: 8.3 % (ref 4–5.6)

## 2022-12-06 ENCOUNTER — TELEPHONE (OUTPATIENT)
Dept: INTERNAL MEDICINE | Facility: CLINIC | Age: 72
End: 2022-12-06
Payer: COMMERCIAL

## 2022-12-06 NOTE — TELEPHONE ENCOUNTER
----- Message from Gemma Boone MD sent at 12/4/2022  7:29 PM CST -----  Please notify jake gordon, his caregiver that his hemoglobin A1C is better at 8.3 - sugars are doing better  SF

## 2022-12-09 ENCOUNTER — TELEPHONE (OUTPATIENT)
Dept: INTERNAL MEDICINE | Facility: CLINIC | Age: 72
End: 2022-12-09
Payer: COMMERCIAL

## 2022-12-09 NOTE — TELEPHONE ENCOUNTER
----- Message from Lucio Sauceda sent at 12/9/2022 11:09 AM CST -----  Contact: jake gordon  Patient is returning a phone call.  Who left a message for the patient: Gwen  Does patient know what this is regarding:  No

## 2023-02-22 DIAGNOSIS — E11.9 TYPE 2 DIABETES MELLITUS WITHOUT COMPLICATION: ICD-10-CM

## 2023-02-27 ENCOUNTER — PATIENT MESSAGE (OUTPATIENT)
Dept: ADMINISTRATIVE | Facility: HOSPITAL | Age: 73
End: 2023-02-27
Payer: MEDICARE

## 2023-03-04 NOTE — TELEPHONE ENCOUNTER
Pt arrived to the ED via EMS. Pt states she was using the bathroom and felt \"like a ball dropped out of my vagina. Its like swollen.\" pt denies any pain. Pt denies any fever/CP/SOB. Per EMS no trauma to the area noted, no foreign objected noted.    Spoke with patient wife. Patient wife stated that yesterday afternoon and today patient glucose is now in the 100-200 range and at his baseline

## 2023-03-31 ENCOUNTER — LAB VISIT (OUTPATIENT)
Dept: LAB | Facility: HOSPITAL | Age: 73
End: 2023-03-31
Attending: INTERNAL MEDICINE
Payer: MEDICARE

## 2023-03-31 ENCOUNTER — OFFICE VISIT (OUTPATIENT)
Dept: INTERNAL MEDICINE | Facility: CLINIC | Age: 73
End: 2023-03-31
Payer: MEDICARE

## 2023-03-31 VITALS
HEIGHT: 72 IN | DIASTOLIC BLOOD PRESSURE: 52 MMHG | WEIGHT: 157.19 LBS | HEART RATE: 63 BPM | OXYGEN SATURATION: 96 % | SYSTOLIC BLOOD PRESSURE: 112 MMHG | BODY MASS INDEX: 21.29 KG/M2

## 2023-03-31 DIAGNOSIS — F31.9 BIPOLAR AFFECTIVE DISORDER, REMISSION STATUS UNSPECIFIED: ICD-10-CM

## 2023-03-31 DIAGNOSIS — E10.8 TYPE 1 DIABETES MELLITUS WITH COMPLICATION: ICD-10-CM

## 2023-03-31 DIAGNOSIS — Z00.00 ROUTINE GENERAL MEDICAL EXAMINATION AT A HEALTH CARE FACILITY: Primary | ICD-10-CM

## 2023-03-31 DIAGNOSIS — F01.50 VASCULAR DEMENTIA, UNCOMPLICATED: ICD-10-CM

## 2023-03-31 DIAGNOSIS — J43.9 PULMONARY EMPHYSEMA, UNSPECIFIED EMPHYSEMA TYPE: ICD-10-CM

## 2023-03-31 DIAGNOSIS — G40.909 SEIZURE DISORDER: ICD-10-CM

## 2023-03-31 LAB
ALBUMIN SERPL BCP-MCNC: 3.3 G/DL (ref 3.5–5.2)
ALP SERPL-CCNC: 113 U/L (ref 55–135)
ALT SERPL W/O P-5'-P-CCNC: 33 U/L (ref 10–44)
ANION GAP SERPL CALC-SCNC: 8 MMOL/L (ref 8–16)
AST SERPL-CCNC: 20 U/L (ref 10–40)
BASOPHILS # BLD AUTO: 0.03 K/UL (ref 0–0.2)
BASOPHILS NFR BLD: 0.6 % (ref 0–1.9)
BILIRUB SERPL-MCNC: 0.3 MG/DL (ref 0.1–1)
BUN SERPL-MCNC: 17 MG/DL (ref 8–23)
CALCIUM SERPL-MCNC: 8.7 MG/DL (ref 8.7–10.5)
CHLORIDE SERPL-SCNC: 100 MMOL/L (ref 95–110)
CO2 SERPL-SCNC: 28 MMOL/L (ref 23–29)
CREAT SERPL-MCNC: 0.8 MG/DL (ref 0.5–1.4)
DIFFERENTIAL METHOD: ABNORMAL
EOSINOPHIL # BLD AUTO: 0.3 K/UL (ref 0–0.5)
EOSINOPHIL NFR BLD: 5.2 % (ref 0–8)
ERYTHROCYTE [DISTWIDTH] IN BLOOD BY AUTOMATED COUNT: 12.3 % (ref 11.5–14.5)
EST. GFR  (NO RACE VARIABLE): >60 ML/MIN/1.73 M^2
ESTIMATED AVG GLUCOSE: 206 MG/DL (ref 68–131)
GLUCOSE SERPL-MCNC: 294 MG/DL (ref 70–110)
HBA1C MFR BLD: 8.8 % (ref 4–5.6)
HCT VFR BLD AUTO: 36.4 % (ref 40–54)
HGB BLD-MCNC: 11.5 G/DL (ref 14–18)
IMM GRANULOCYTES # BLD AUTO: 0.01 K/UL (ref 0–0.04)
IMM GRANULOCYTES NFR BLD AUTO: 0.2 % (ref 0–0.5)
LYMPHOCYTES # BLD AUTO: 2.2 K/UL (ref 1–4.8)
LYMPHOCYTES NFR BLD: 41.9 % (ref 18–48)
MCH RBC QN AUTO: 29.9 PG (ref 27–31)
MCHC RBC AUTO-ENTMCNC: 31.6 G/DL (ref 32–36)
MCV RBC AUTO: 95 FL (ref 82–98)
MONOCYTES # BLD AUTO: 0.5 K/UL (ref 0.3–1)
MONOCYTES NFR BLD: 10.2 % (ref 4–15)
NEUTROPHILS # BLD AUTO: 2.2 K/UL (ref 1.8–7.7)
NEUTROPHILS NFR BLD: 41.9 % (ref 38–73)
NRBC BLD-RTO: 0 /100 WBC
PLATELET # BLD AUTO: 237 K/UL (ref 150–450)
PMV BLD AUTO: 11.7 FL (ref 9.2–12.9)
POTASSIUM SERPL-SCNC: 4.5 MMOL/L (ref 3.5–5.1)
PROT SERPL-MCNC: 7 G/DL (ref 6–8.4)
RBC # BLD AUTO: 3.85 M/UL (ref 4.6–6.2)
SODIUM SERPL-SCNC: 136 MMOL/L (ref 136–145)
TSH SERPL DL<=0.005 MIU/L-ACNC: 2.67 UIU/ML (ref 0.4–4)
WBC # BLD AUTO: 5.18 K/UL (ref 3.9–12.7)

## 2023-03-31 PROCEDURE — 84443 ASSAY THYROID STIM HORMONE: CPT | Performed by: INTERNAL MEDICINE

## 2023-03-31 PROCEDURE — 4010F PR ACE/ARB THEARPY RXD/TAKEN: ICD-10-PCS | Mod: CPTII,S$GLB,, | Performed by: INTERNAL MEDICINE

## 2023-03-31 PROCEDURE — 1157F ADVNC CARE PLAN IN RCRD: CPT | Mod: CPTII,S$GLB,, | Performed by: INTERNAL MEDICINE

## 2023-03-31 PROCEDURE — 3008F PR BODY MASS INDEX (BMI) DOCUMENTED: ICD-10-PCS | Mod: CPTII,S$GLB,, | Performed by: INTERNAL MEDICINE

## 2023-03-31 PROCEDURE — 4010F ACE/ARB THERAPY RXD/TAKEN: CPT | Mod: CPTII,S$GLB,, | Performed by: INTERNAL MEDICINE

## 2023-03-31 PROCEDURE — 99214 OFFICE O/P EST MOD 30 MIN: CPT | Mod: S$GLB,,, | Performed by: INTERNAL MEDICINE

## 2023-03-31 PROCEDURE — 85025 COMPLETE CBC W/AUTO DIFF WBC: CPT | Performed by: INTERNAL MEDICINE

## 2023-03-31 PROCEDURE — 1126F AMNT PAIN NOTED NONE PRSNT: CPT | Mod: CPTII,S$GLB,, | Performed by: INTERNAL MEDICINE

## 2023-03-31 PROCEDURE — 1159F MED LIST DOCD IN RCRD: CPT | Mod: CPTII,S$GLB,, | Performed by: INTERNAL MEDICINE

## 2023-03-31 PROCEDURE — 3008F BODY MASS INDEX DOCD: CPT | Mod: CPTII,S$GLB,, | Performed by: INTERNAL MEDICINE

## 2023-03-31 PROCEDURE — 3288F FALL RISK ASSESSMENT DOCD: CPT | Mod: CPTII,S$GLB,, | Performed by: INTERNAL MEDICINE

## 2023-03-31 PROCEDURE — 80053 COMPREHEN METABOLIC PANEL: CPT | Performed by: INTERNAL MEDICINE

## 2023-03-31 PROCEDURE — 83036 HEMOGLOBIN GLYCOSYLATED A1C: CPT | Performed by: INTERNAL MEDICINE

## 2023-03-31 PROCEDURE — 1101F PT FALLS ASSESS-DOCD LE1/YR: CPT | Mod: CPTII,S$GLB,, | Performed by: INTERNAL MEDICINE

## 2023-03-31 PROCEDURE — 1159F PR MEDICATION LIST DOCUMENTED IN MEDICAL RECORD: ICD-10-PCS | Mod: CPTII,S$GLB,, | Performed by: INTERNAL MEDICINE

## 2023-03-31 PROCEDURE — 99999 PR PBB SHADOW E&M-EST. PATIENT-LVL IV: CPT | Mod: PBBFAC,,, | Performed by: INTERNAL MEDICINE

## 2023-03-31 PROCEDURE — 99999 PR PBB SHADOW E&M-EST. PATIENT-LVL IV: ICD-10-PCS | Mod: PBBFAC,,, | Performed by: INTERNAL MEDICINE

## 2023-03-31 PROCEDURE — 1126F PR PAIN SEVERITY QUANTIFIED, NO PAIN PRESENT: ICD-10-PCS | Mod: CPTII,S$GLB,, | Performed by: INTERNAL MEDICINE

## 2023-03-31 PROCEDURE — 1101F PR PT FALLS ASSESS DOC 0-1 FALLS W/OUT INJ PAST YR: ICD-10-PCS | Mod: CPTII,S$GLB,, | Performed by: INTERNAL MEDICINE

## 2023-03-31 PROCEDURE — 3074F PR MOST RECENT SYSTOLIC BLOOD PRESSURE < 130 MM HG: ICD-10-PCS | Mod: CPTII,S$GLB,, | Performed by: INTERNAL MEDICINE

## 2023-03-31 PROCEDURE — 36415 COLL VENOUS BLD VENIPUNCTURE: CPT | Performed by: INTERNAL MEDICINE

## 2023-03-31 PROCEDURE — 1157F PR ADVANCE CARE PLAN OR EQUIV PRESENT IN MEDICAL RECORD: ICD-10-PCS | Mod: CPTII,S$GLB,, | Performed by: INTERNAL MEDICINE

## 2023-03-31 PROCEDURE — 3074F SYST BP LT 130 MM HG: CPT | Mod: CPTII,S$GLB,, | Performed by: INTERNAL MEDICINE

## 2023-03-31 PROCEDURE — 3288F PR FALLS RISK ASSESSMENT DOCUMENTED: ICD-10-PCS | Mod: CPTII,S$GLB,, | Performed by: INTERNAL MEDICINE

## 2023-03-31 PROCEDURE — 3078F DIAST BP <80 MM HG: CPT | Mod: CPTII,S$GLB,, | Performed by: INTERNAL MEDICINE

## 2023-03-31 PROCEDURE — 3078F PR MOST RECENT DIASTOLIC BLOOD PRESSURE < 80 MM HG: ICD-10-PCS | Mod: CPTII,S$GLB,, | Performed by: INTERNAL MEDICINE

## 2023-03-31 PROCEDURE — 99214 PR OFFICE/OUTPT VISIT, EST, LEVL IV, 30-39 MIN: ICD-10-PCS | Mod: S$GLB,,, | Performed by: INTERNAL MEDICINE

## 2023-03-31 NOTE — PROGRESS NOTES
CHIEF COMPLAINT: Follow up of multiple issues  HISTORY OF PRESENT ILLNESS: 72-year-old man presents for follow up of above     He has been doing well. He lives with his Costa Ledesma ([phone]), who helps him with his medications and meals. Most of the history is obtained from Costa due to his dementia. He is happy living with Costa. He is eating well.       HE is now taking ashwagandha (orgainic ashwagandha 380 mg plus ashwagandha extract 95 mg) one capsule once daily and Holy Basil Leaf (holy basil leaf extract 292 mg and holy basil leaf supercritical extract 190 mg) one capsule once daily.  HE is also taking Melatonin 10 mg 2  times daily which helps calm him. He is sleeping well. He has less movement of arms, legs, tongue.  he is sitting still and can hold a short conversation.   .  Mood is better on the supplements. HE is calmer.  Disposition is ok.  Memory is about 5 minutes Swallowing is better. No chocking.  He no longer worries about things.   HE has a sense of humor.  He saw neurology 5/21/18 who recommened Zyprexa 2.5 mg twice daily which made things worse. HE was hearing voices so Costa stopped Zyprexa medication. He is forgetting to drink his water.  Costa has to remind him to drink water.      HE is taking prozac 40 mg daily, depakote 250 mg in am and 500 mg in the evening and cogentin 0.5 mg twice daily for his mood and seizure disorder. Mood is good He has not had any seizures.  He is not paranoid.  He sleeps well.  No depression.      He is currently taking Lantus 20 units in the evening and Novolog 5-10  units plus sliding scale before meals. Blood sugars have been better 120-150.   No fever, chills, nausea, voimiting, constipation, diarrhea. No significant hypoglycemia     No dysuria, hematuria, nocturia, straining or incomplete void, chest pain, shortness of breath, rhinorrhea, headache, blurred vision, hearing loss. He continues to take Crestor 10 mg daily for his hyperlipidemia. He denies  any joint pain, muscle pain. He continues to take enalapril 20 mg twice daily and Norvasc 10 mg daily for his hypertension. He is on omeprazole 20 mg once daily for reflux and has not had any reflux symptoms.              REVIEW OF SYSTEMS: She denies fevers, chills, night sweats, fatigue, visual change, hearing loss, sinus congestion, sore throat, chest pain, shortness of breath, nausea, vomiting, constipation, diarrhea, dysuria, hematuria, polydipsia, polyuria, joint pain, muscle pain, headaches.        PAST MEDICAL HISTORY:   1. Vascular dementia.   2. Type I diabetes.   3. Seizure disorder.   4. History of polysubstance abuse.   5. Recovered alcoholic   6. Bipolar disorder.   7. History of renal insufficiency.   8. Hepatitis C.   9. History of left a left upper extremity DVT January 2007 after an IV.   10. First cervical spine arthritis on C6-C7 on x-ray.   11. Hospitalization July 2008 due to ingestion of caustic substance, which caused erosive esophagitis.    12. History of her aspiration during hospitalization 7/2008.     MEDICATIONS and ALLERGIES: Updated on epic.     PHYSICAL EXAMINATION:     BP (!) 112/52   Pulse 63   Ht 6' (1.829 m)   Wt 71.3 kg (157 lb 3 oz)   SpO2 96%   BMI 21.32 kg/m²     General: Alert, oriented. No apparent distress. Affect within normal limits.   Conjunctivae anicteric. Tympanic membranes clear. Oropharynx clear. Poor dentition  Neck supple.   Respiratory effort normal. Lungs clear bilaterally. Good air flow  Heart: Regular rate and rhythm without murmurs, gallops or rubs.   No lower extremity edema.   Abdomen: Soft, nondistended, nontender. Bowel sounds present. No   hepatosplenomegaly.                  ASSESSMENT AND PLAN:      1. Type I diabetes labs today  2. Hypertension - controlled.   3. Hyperlipidemia - controlled  4. Hepatitis C -saw hepatology 10/2018. Hepatitis C RNA negative.   5. Bipolar disorder with vascular dementia.  - mood better    7. COPD/emphysema - has  quit. asx  9. Seizure disorder - asx  Screening - . Pt declines colonscopy. Cologuard- care giver will not fill out.   10. Movement disorder - better with supplements  11. Cannot do colonoscopy prep  I'll see him back in 6 months, sooner if problems arise

## 2023-04-03 ENCOUNTER — TELEPHONE (OUTPATIENT)
Dept: INTERNAL MEDICINE | Facility: CLINIC | Age: 73
End: 2023-04-03
Payer: MEDICARE

## 2023-04-03 ENCOUNTER — PATIENT MESSAGE (OUTPATIENT)
Dept: ADMINISTRATIVE | Facility: HOSPITAL | Age: 73
End: 2023-04-03
Payer: MEDICARE

## 2023-04-03 NOTE — TELEPHONE ENCOUNTER
No answer. I left a detailed message on his recorder to have caretaker call back with any questions.

## 2023-04-03 NOTE — TELEPHONE ENCOUNTER
----- Message from Gemma Boone MD sent at 4/2/2023  9:58 PM CDT -----  Please notify his caregiver, Costa Ledesma that Layo blood work is stable. Continue current medications  Gemma Boone M.D.

## 2023-04-04 ENCOUNTER — TELEPHONE (OUTPATIENT)
Dept: INTERNAL MEDICINE | Facility: CLINIC | Age: 73
End: 2023-04-04
Payer: MEDICARE

## 2023-04-04 NOTE — TELEPHONE ENCOUNTER
----- Message from Niki Barton sent at 4/3/2023  4:43 PM CDT -----  Contact: [phone]  Pts wife is calling she states she had a call from the office please give return call she is asking for a VM as to what the call is regarding

## 2023-04-04 NOTE — TELEPHONE ENCOUNTER
Called and spoke to pt and advised her that the labs are good and to continue the current medications

## 2023-04-04 NOTE — TELEPHONE ENCOUNTER
----- Message from Barbara Quiroz sent at 4/4/2023 10:49 AM CDT -----  Contact: [phone]  Pt's care giver Costa called to request the pt's lab results be mailed to her. She says she has been receiving multiple calls. She says the last time she spoke to someone she was told that everything was fine, but then she received a message saying something different. Please Advise

## 2023-05-11 ENCOUNTER — PATIENT MESSAGE (OUTPATIENT)
Dept: INTERNAL MEDICINE | Facility: CLINIC | Age: 73
End: 2023-05-11
Payer: MEDICARE

## 2023-08-11 DIAGNOSIS — G25.5 OTHER CHOREA: ICD-10-CM

## 2023-08-11 RX ORDER — OLANZAPINE 2.5 MG/1
TABLET ORAL
Qty: 60 TABLET | Refills: 9 | Status: SHIPPED | OUTPATIENT
Start: 2023-08-11

## 2023-08-11 NOTE — TELEPHONE ENCOUNTER
Care Due:                  Date            Visit Type   Department     Provider  --------------------------------------------------------------------------------                                EP -                              PRIMARY      [facility] INTERNAL  Last Visit: 03-      CARE ([facility])   MEDICINE       MONICA SCHULTE                              [facility]                              PRIMARY      [facility] INTERNAL  Next Visit: 10-      CARE ([facility])   MEDICINE       MONICA SCHULTE                                                            Last  Test          Frequency    Reason                     Performed    Due Date  --------------------------------------------------------------------------------    HBA1C.......  6 months...  insulin..................  03- 09-    Lipid Panel.  12 months..  rosuvastatin.............  07- 07-    [employer] Embedded Care Due Messages. Reference number: [id number].   8/11/2023 2:32:36 PM CDT

## 2023-09-29 ENCOUNTER — LAB VISIT (OUTPATIENT)
Dept: LAB | Facility: HOSPITAL | Age: 73
End: 2023-09-29
Attending: INTERNAL MEDICINE
Payer: MEDICARE

## 2023-09-29 DIAGNOSIS — E10.8 TYPE 1 DIABETES MELLITUS WITH COMPLICATION: ICD-10-CM

## 2023-09-29 LAB
ALBUMIN SERPL BCP-MCNC: 3.3 G/DL (ref 3.5–5.2)
ALP SERPL-CCNC: 81 U/L (ref 55–135)
ALT SERPL W/O P-5'-P-CCNC: 25 U/L (ref 10–44)
ANION GAP SERPL CALC-SCNC: 8 MMOL/L (ref 8–16)
AST SERPL-CCNC: 22 U/L (ref 10–40)
BASOPHILS # BLD AUTO: 0.04 K/UL (ref 0–0.2)
BASOPHILS NFR BLD: 0.8 % (ref 0–1.9)
BILIRUB SERPL-MCNC: 0.3 MG/DL (ref 0.1–1)
BUN SERPL-MCNC: 13 MG/DL (ref 8–23)
CALCIUM SERPL-MCNC: 9.1 MG/DL (ref 8.7–10.5)
CHLORIDE SERPL-SCNC: 107 MMOL/L (ref 95–110)
CHOLEST SERPL-MCNC: 129 MG/DL (ref 120–199)
CHOLEST/HDLC SERPL: 2.7 {RATIO} (ref 2–5)
CO2 SERPL-SCNC: 29 MMOL/L (ref 23–29)
CREAT SERPL-MCNC: 0.8 MG/DL (ref 0.5–1.4)
DIFFERENTIAL METHOD: ABNORMAL
EOSINOPHIL # BLD AUTO: 0.4 K/UL (ref 0–0.5)
EOSINOPHIL NFR BLD: 7.7 % (ref 0–8)
ERYTHROCYTE [DISTWIDTH] IN BLOOD BY AUTOMATED COUNT: 13 % (ref 11.5–14.5)
EST. GFR  (NO RACE VARIABLE): >60 ML/MIN/1.73 M^2
ESTIMATED AVG GLUCOSE: 189 MG/DL (ref 68–131)
GLUCOSE SERPL-MCNC: 102 MG/DL (ref 70–110)
HBA1C MFR BLD: 8.2 % (ref 4–5.6)
HCT VFR BLD AUTO: 37.2 % (ref 40–54)
HDLC SERPL-MCNC: 48 MG/DL (ref 40–75)
HDLC SERPL: 37.2 % (ref 20–50)
HGB BLD-MCNC: 12.2 G/DL (ref 14–18)
IMM GRANULOCYTES # BLD AUTO: 0.01 K/UL (ref 0–0.04)
IMM GRANULOCYTES NFR BLD AUTO: 0.2 % (ref 0–0.5)
LDLC SERPL CALC-MCNC: 68 MG/DL (ref 63–159)
LYMPHOCYTES # BLD AUTO: 2 K/UL (ref 1–4.8)
LYMPHOCYTES NFR BLD: 40.7 % (ref 18–48)
MCH RBC QN AUTO: 29.8 PG (ref 27–31)
MCHC RBC AUTO-ENTMCNC: 32.8 G/DL (ref 32–36)
MCV RBC AUTO: 91 FL (ref 82–98)
MONOCYTES # BLD AUTO: 0.6 K/UL (ref 0.3–1)
MONOCYTES NFR BLD: 12 % (ref 4–15)
NEUTROPHILS # BLD AUTO: 1.9 K/UL (ref 1.8–7.7)
NEUTROPHILS NFR BLD: 38.6 % (ref 38–73)
NONHDLC SERPL-MCNC: 81 MG/DL
NRBC BLD-RTO: 0 /100 WBC
PLATELET # BLD AUTO: 215 K/UL (ref 150–450)
PMV BLD AUTO: 12.2 FL (ref 9.2–12.9)
POTASSIUM SERPL-SCNC: 4.6 MMOL/L (ref 3.5–5.1)
PROT SERPL-MCNC: 7 G/DL (ref 6–8.4)
RBC # BLD AUTO: 4.1 M/UL (ref 4.6–6.2)
SODIUM SERPL-SCNC: 144 MMOL/L (ref 136–145)
TRIGL SERPL-MCNC: 65 MG/DL (ref 30–150)
TSH SERPL DL<=0.005 MIU/L-ACNC: 3.67 UIU/ML (ref 0.4–4)
WBC # BLD AUTO: 4.91 K/UL (ref 3.9–12.7)

## 2023-09-29 PROCEDURE — 85025 COMPLETE CBC W/AUTO DIFF WBC: CPT | Performed by: INTERNAL MEDICINE

## 2023-09-29 PROCEDURE — 83036 HEMOGLOBIN GLYCOSYLATED A1C: CPT | Performed by: INTERNAL MEDICINE

## 2023-09-29 PROCEDURE — 80061 LIPID PANEL: CPT | Performed by: INTERNAL MEDICINE

## 2023-09-29 PROCEDURE — 84443 ASSAY THYROID STIM HORMONE: CPT | Performed by: INTERNAL MEDICINE

## 2023-09-29 PROCEDURE — 80053 COMPREHEN METABOLIC PANEL: CPT | Performed by: INTERNAL MEDICINE

## 2023-09-29 PROCEDURE — 36415 COLL VENOUS BLD VENIPUNCTURE: CPT | Performed by: INTERNAL MEDICINE

## 2023-10-04 ENCOUNTER — OFFICE VISIT (OUTPATIENT)
Dept: INTERNAL MEDICINE | Facility: CLINIC | Age: 73
End: 2023-10-04
Payer: MEDICARE

## 2023-10-04 VITALS
HEART RATE: 65 BPM | DIASTOLIC BLOOD PRESSURE: 70 MMHG | WEIGHT: 159.81 LBS | OXYGEN SATURATION: 97 % | SYSTOLIC BLOOD PRESSURE: 120 MMHG | HEIGHT: 72 IN | BODY MASS INDEX: 21.65 KG/M2

## 2023-10-04 DIAGNOSIS — E10.8 TYPE 1 DIABETES MELLITUS WITH COMPLICATION: ICD-10-CM

## 2023-10-04 DIAGNOSIS — E53.8 VITAMIN B12 DEFICIENCY: ICD-10-CM

## 2023-10-04 DIAGNOSIS — F17.211 CIGARETTE NICOTINE DEPENDENCE IN REMISSION: ICD-10-CM

## 2023-10-04 DIAGNOSIS — E55.9 VITAMIN D DEFICIENCY DISEASE: ICD-10-CM

## 2023-10-04 DIAGNOSIS — S81.809A OPEN WOUND OF LOWER LEG, UNSPECIFIED LATERALITY, INITIAL ENCOUNTER: ICD-10-CM

## 2023-10-04 DIAGNOSIS — Z00.00 ROUTINE GENERAL MEDICAL EXAMINATION AT A HEALTH CARE FACILITY: Primary | ICD-10-CM

## 2023-10-04 DIAGNOSIS — Z12.5 SCREENING PSA (PROSTATE SPECIFIC ANTIGEN): ICD-10-CM

## 2023-10-04 PROCEDURE — 99397 PER PM REEVAL EST PAT 65+ YR: CPT | Mod: S$GLB,,, | Performed by: INTERNAL MEDICINE

## 2023-10-04 PROCEDURE — 99999 PR PBB SHADOW E&M-EST. PATIENT-LVL III: ICD-10-PCS | Mod: PBBFAC,,, | Performed by: INTERNAL MEDICINE

## 2023-10-04 PROCEDURE — 1157F PR ADVANCE CARE PLAN OR EQUIV PRESENT IN MEDICAL RECORD: ICD-10-PCS | Mod: CPTII,S$GLB,, | Performed by: INTERNAL MEDICINE

## 2023-10-04 PROCEDURE — G0008 ADMIN INFLUENZA VIRUS VAC: HCPCS | Mod: S$GLB,,, | Performed by: INTERNAL MEDICINE

## 2023-10-04 PROCEDURE — 4010F PR ACE/ARB THEARPY RXD/TAKEN: ICD-10-PCS | Mod: CPTII,S$GLB,, | Performed by: INTERNAL MEDICINE

## 2023-10-04 PROCEDURE — 3074F SYST BP LT 130 MM HG: CPT | Mod: CPTII,S$GLB,, | Performed by: INTERNAL MEDICINE

## 2023-10-04 PROCEDURE — 99397 PR PREVENTIVE VISIT,EST,65 & OVER: ICD-10-PCS | Mod: S$GLB,,, | Performed by: INTERNAL MEDICINE

## 2023-10-04 PROCEDURE — 90694 VACC AIIV4 NO PRSRV 0.5ML IM: CPT | Mod: S$GLB,,, | Performed by: INTERNAL MEDICINE

## 2023-10-04 PROCEDURE — 3008F PR BODY MASS INDEX (BMI) DOCUMENTED: ICD-10-PCS | Mod: CPTII,S$GLB,, | Performed by: INTERNAL MEDICINE

## 2023-10-04 PROCEDURE — 3078F DIAST BP <80 MM HG: CPT | Mod: CPTII,S$GLB,, | Performed by: INTERNAL MEDICINE

## 2023-10-04 PROCEDURE — 3052F PR MOST RECENT HEMOGLOBIN A1C LEVEL 8.0 - < 9.0%: ICD-10-PCS | Mod: CPTII,S$GLB,, | Performed by: INTERNAL MEDICINE

## 2023-10-04 PROCEDURE — 3078F PR MOST RECENT DIASTOLIC BLOOD PRESSURE < 80 MM HG: ICD-10-PCS | Mod: CPTII,S$GLB,, | Performed by: INTERNAL MEDICINE

## 2023-10-04 PROCEDURE — 90694 FLU VACCINE - QUADRIVALENT - ADJUVANTED: ICD-10-PCS | Mod: S$GLB,,, | Performed by: INTERNAL MEDICINE

## 2023-10-04 PROCEDURE — 1157F ADVNC CARE PLAN IN RCRD: CPT | Mod: CPTII,S$GLB,, | Performed by: INTERNAL MEDICINE

## 2023-10-04 PROCEDURE — 3052F HG A1C>EQUAL 8.0%<EQUAL 9.0%: CPT | Mod: CPTII,S$GLB,, | Performed by: INTERNAL MEDICINE

## 2023-10-04 PROCEDURE — G0008 FLU VACCINE - QUADRIVALENT - ADJUVANTED: ICD-10-PCS | Mod: S$GLB,,, | Performed by: INTERNAL MEDICINE

## 2023-10-04 PROCEDURE — 3008F BODY MASS INDEX DOCD: CPT | Mod: CPTII,S$GLB,, | Performed by: INTERNAL MEDICINE

## 2023-10-04 PROCEDURE — 4010F ACE/ARB THERAPY RXD/TAKEN: CPT | Mod: CPTII,S$GLB,, | Performed by: INTERNAL MEDICINE

## 2023-10-04 PROCEDURE — 99999 PR PBB SHADOW E&M-EST. PATIENT-LVL III: CPT | Mod: PBBFAC,,, | Performed by: INTERNAL MEDICINE

## 2023-10-04 PROCEDURE — 3074F PR MOST RECENT SYSTOLIC BLOOD PRESSURE < 130 MM HG: ICD-10-PCS | Mod: CPTII,S$GLB,, | Performed by: INTERNAL MEDICINE

## 2023-10-04 NOTE — PROGRESS NOTES
CHIEF COMPLAINT:Annual exam and  Follow up of multiple issues  HISTORY OF PRESENT ILLNESS: 73-year-old man presents for follow up of above     He has been doing well. He lives with his Costa Ledesma ([phone]), who helps him with his medications and meals. Most of the history is obtained from Costa due to his dementia. He is happy living with Costa. He is eating well.       HE is now taking ashwagandha (orgainic ashwagandha 380 mg plus ashwagandha extract 95 mg) one capsule once daily and Holy Basil Leaf (holy basil leaf extract 292 mg and holy basil leaf supercritical extract 190 mg) one capsule once daily.  HE is also taking Melatonin 10 mg 2  times daily which helps calm him. He is sleeping well. He has less movement of arms, legs, tongue.  he is sitting still and can hold a short conversation.   .  Mood is better on the supplements. HE is calmer.  Disposition is ok.  Memory is about 5 minutes Swallowing is better. No chocking.  He no longer worries about things.   HE has a sense of humor.  He saw neurology 5/21/18 who recommened Zyprexa 2.5 mg twice daily which made things worse. He is forgetting to drink his water.  Costa has to remind him to drink water.      HE is taking prozac 40 mg daily, depakote 250 mg in am and 500 mg in the evening and cogentin 0.5 mg twice daily for his mood and seizure disorder. Mood is good He has not had any seizures.  He is not paranoid.  He sleeps well.  No depression. He is possibly taking olanzapine 2.5 mg twice daily      He is currently taking Lantus 20 units in the evening and Novolog 5-10  units plus sliding scale before meals. Blood sugars have been better 120-150.   No fever, chills, nausea, voimiting, constipation, diarrhea. No significant hypoglycemia     No dysuria, hematuria, nocturia, straining or incomplete void, chest pain, shortness of breath, rhinorrhea, headache, blurred vision, hearing loss. He continues to take Crestor 10 mg daily for his hyperlipidemia. He  denies any joint pain, muscle pain. He continues to take enalapril 20 mg twice daily and Norvasc 10 mg daily for his hypertension. He is on omeprazole 20 mg once daily for reflux and has not had any reflux symptoms.              REVIEW OF SYSTEMS: She denies fevers, chills, night sweats, fatigue, visual change, hearing loss, sinus congestion, sore throat, chest pain, shortness of breath, nausea, vomiting, constipation, diarrhea, dysuria, hematuria, polydipsia, polyuria, joint pain, muscle pain, headaches.        PAST MEDICAL HISTORY:   1. Vascular dementia.   2. Type I diabetes.   3. Seizure disorder.   4. History of polysubstance abuse.   5. Recovered alcoholic   6. Bipolar disorder.   7. History of renal insufficiency.   8. Hepatitis C.   9. History of left a left upper extremity DVT January 2007 after an IV.   10. First cervical spine arthritis on C6-C7 on x-ray.   11. Hospitalization July 2008 due to ingestion of caustic substance, which caused erosive esophagitis.    12. History of her aspiration during hospitalization 7/2008.     MEDICATIONS and ALLERGIES: Updated on epic.     PHYSICAL EXAMINATION:      /70   Pulse 65   Ht 6' (1.829 m)   Wt 72.5 kg (159 lb 13.3 oz)   SpO2 97%   BMI 21.68 kg/m²     General: Alert, oriented. No apparent distress. Affect within normal limits.   Conjunctivae anicteric. Tympanic membranes clear. Oropharynx clear. Poor dentition  Neck supple.   Respiratory effort normal. Lungs clear bilaterally. Good air flow  Heart: Regular rate and rhythm without murmurs, gallops or rubs.   No lower extremity edema.   Abdomen: Soft, nondistended, nontender. Bowel sounds present. No   hepatosplenomegaly.      Protective Sensation (w/ 10 gram monofilament):  Right: Decreased  Left: Decreased    Visual Inspection:  Wounds on lower legs bilaterally - left greater than right    Pedal Pulses:   Right: Diminished  Left: Diminished    Posterior Tibialis Pulses:   Right:Diminished  Left:  Diminished               labs 9/29/23 reviewed     ASSESSMENT AND PLAN:      Annual exam - discussed diet, exercise and safety issues.      1. Type I diabetes - better controlled  2. Hypertension - controlled.   3. Hyperlipidemia - controlled  4. Hepatitis C -saw hepatology 10/2018. Hepatitis C RNA negative.   5. Bipolar disorder with vascular dementia.  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 Seizure disorder - asx  Screening - . Pt declines colonscopy. Cologuard- care giver will not fill out.   10. Movement disorder - better with supplements  11. Cannot do colonoscopy prep  12. Wounds on lower legs- wound care.   I'll see him back in 6 months, sooner if problems arise

## 2023-10-09 ENCOUNTER — TELEPHONE (OUTPATIENT)
Dept: INTERNAL MEDICINE | Facility: CLINIC | Age: 73
End: 2023-10-09
Payer: MEDICARE

## 2023-10-09 NOTE — TELEPHONE ENCOUNTER
----- Message from Jose Vasques sent at 10/9/2023  2:54 PM CDT -----  Contact: Costa caregiver   Pt's care giver called in regards to she noticed on the after visit summary that he has a   Vitamin B12 deficiency and Vitamin D deficiency disease is there any thing thet he needs to do to impr

## 2023-12-21 DIAGNOSIS — E10.8 TYPE 1 DIABETES MELLITUS WITH COMPLICATION: ICD-10-CM

## 2023-12-21 NOTE — TELEPHONE ENCOUNTER
No care due was identified.  Health [facility] Embedded Care Due Messages. Reference number: [id number].   12/21/2023 11:33:41 AM CST

## 2023-12-22 RX ORDER — INSULIN GLARGINE 100 [IU]/ML
20 INJECTION, SOLUTION SUBCUTANEOUS DAILY
Qty: 15 ML | Refills: 1 | Status: SHIPPED | OUTPATIENT
Start: 2023-12-22

## 2023-12-22 RX ORDER — PEN NEEDLE, DIABETIC 30 GX3/16"
NEEDLE, DISPOSABLE MISCELLANEOUS
Qty: 400 EACH | Refills: 3 | Status: SHIPPED | OUTPATIENT
Start: 2023-12-22

## 2023-12-22 NOTE — TELEPHONE ENCOUNTER
Refill Decision Note   Layo Haney  is requesting a refill authorization.  Brief Assessment and Rationale for Refill:  Approve     Medication Therapy Plan:         Comments:     Note composed:8:12 AM 12/22/2023

## 2024-01-18 ENCOUNTER — TELEPHONE (OUTPATIENT)
Dept: INTERNAL MEDICINE | Facility: CLINIC | Age: 74
End: 2024-01-18
Payer: MEDICARE

## 2024-01-18 NOTE — TELEPHONE ENCOUNTER
----- Message from Venus Manjarrez sent at 1/18/2024  4:11 PM CST -----  Contact: Friend [phone]  a phone call.  Who left a message:  Friend  Do they know what this is regarding:  calling to receive a call back due to pt waking up in the middle of the night screaming, and also he is getting more confused. Please call back to advise.  Would they like a phone call back or a response via [facility]:  call back

## 2024-01-22 ENCOUNTER — TELEPHONE (OUTPATIENT)
Dept: INTERNAL MEDICINE | Facility: CLINIC | Age: 74
End: 2024-01-22
Payer: MEDICARE

## 2024-01-22 DIAGNOSIS — I10 HYPERTENSION, UNSPECIFIED TYPE: ICD-10-CM

## 2024-01-22 NOTE — TELEPHONE ENCOUNTER
----- Message from Kimberley Boston sent at 1/19/2024  4:13 PM CST -----  Contact: [phone]  Patient is returning a phone call.  Who left a message for the patient: n/a   Does patient know what this is regarding: miss call    Would you like a call back, or a response through your [facility] portal?:   call back   Comments:      Pt would like a call back

## 2024-01-22 NOTE — TELEPHONE ENCOUNTER
Care Due:                  Date            Visit Type   Department     Provider  --------------------------------------------------------------------------------                                EP -                              PRIMARY      [facility] INTERNAL  Last Visit: 10-      CARE ([facility])   MEDICINE       MONICA SCHULTE                              EP -                              PRIMARY      [facility] INTERNAL  Next Visit: 04-      CARE ([facility])   MEDICINE       MONICA SCHULTE                                                            Last  Test          Frequency    Reason                     Performed    Due Date  --------------------------------------------------------------------------------    HBA1C.......  6 months...  insulin..................  09- 03-    Health [facility] Embedded Care Due Messages. Reference number: [id number].   1/22/2024 2:01:10 PM CST

## 2024-01-23 RX ORDER — DIVALPROEX SODIUM 250 MG/1
250 TABLET, FILM COATED, EXTENDED RELEASE ORAL DAILY
Qty: 90 TABLET | Refills: 4 | Status: SHIPPED | OUTPATIENT
Start: 2024-01-23

## 2024-01-23 RX ORDER — ROSUVASTATIN CALCIUM 10 MG/1
10 TABLET, COATED ORAL NIGHTLY
Qty: 90 TABLET | Refills: 2 | Status: SHIPPED | OUTPATIENT
Start: 2024-01-23

## 2024-01-23 RX ORDER — OMEPRAZOLE 20 MG/1
20 CAPSULE, DELAYED RELEASE ORAL DAILY
Qty: 90 CAPSULE | Refills: 2 | Status: SHIPPED | OUTPATIENT
Start: 2024-01-23

## 2024-01-23 RX ORDER — AMLODIPINE BESYLATE 10 MG/1
10 TABLET ORAL DAILY
Qty: 90 TABLET | Refills: 2 | Status: SHIPPED | OUTPATIENT
Start: 2024-01-23

## 2024-01-23 RX ORDER — ENALAPRIL MALEATE 20 MG/1
20 TABLET ORAL 2 TIMES DAILY
Qty: 180 TABLET | Refills: 2 | Status: SHIPPED | OUTPATIENT
Start: 2024-01-23

## 2024-01-23 RX ORDER — DIVALPROEX SODIUM 500 MG/1
TABLET, FILM COATED, EXTENDED RELEASE ORAL
Qty: 90 TABLET | Refills: 4 | Status: SHIPPED | OUTPATIENT
Start: 2024-01-23

## 2024-01-23 RX ORDER — BENZTROPINE MESYLATE 0.5 MG/1
0.5 TABLET ORAL 2 TIMES DAILY
Qty: 180 TABLET | Refills: 4 | Status: SHIPPED | OUTPATIENT
Start: 2024-01-23

## 2024-01-23 NOTE — TELEPHONE ENCOUNTER
Refill Routing Note   Medication(s) are not appropriate for processing by [facility] for the following reason(s):        Outside of protocol    [facility] action(s):  Route  Approve      Medication Therapy Plan: FLOS      Appointments  past 12m or future 3m with PCP    Date Provider   Last Visit   10/4/2023 Gemma Boone MD   Next Visit   4/5/2024 Gemma Boone MD   ED visits in past 90 days: 0        Note composed:3:38 AM 01/23/2024

## 2024-02-07 ENCOUNTER — TELEPHONE (OUTPATIENT)
Dept: INTERNAL MEDICINE | Facility: CLINIC | Age: 74
End: 2024-02-07
Payer: MEDICARE

## 2024-02-07 RX ORDER — FLUOXETINE HYDROCHLORIDE 40 MG/1
40 CAPSULE ORAL
Qty: 90 CAPSULE | Refills: 2 | Status: SHIPPED | OUTPATIENT
Start: 2024-02-07

## 2024-02-07 NOTE — TELEPHONE ENCOUNTER
Care Due:                  Date            Visit Type   Department     Provider  --------------------------------------------------------------------------------                                EP -                              PRIMARY      [facility] INTERNAL  Last Visit: 10-      CARE ([facility])   MEDICINE       MONICA SCHULTE                              EP                               PRIMARY      [facility] INTERNAL  Next Visit: 04-      CARE ([facility])   MEDICINE       MONICA SCHULTE                                                            Last  Test          Frequency    Reason                     Performed    Due Date  --------------------------------------------------------------------------------    Valproic      12 months..  divalproex...............  Not Found    Overdue  Acid (serum)    [employer] Embedded Care Due Messages. Reference number: [id number].   2/07/2024 11:07:10 AM CST

## 2024-02-07 NOTE — TELEPHONE ENCOUNTER
Refill Decision Note   Layo Haney  is requesting a refill authorization.  Brief Assessment and Rationale for Refill:  Approve     Medication Therapy Plan:        Comments:     Note composed:12:04 PM 02/07/2024

## 2024-02-07 NOTE — TELEPHONE ENCOUNTER
----- Message from Niki Barton sent at 2/7/2024 10:52 AM CST -----  Contact: [phone]  [email or web address]     Patient is calling for Medical Advice regarding:brace for the pt call coming from [facility]     How long has patient had these symptoms:    Pharmacy name and phone#:    Would like response via [employer]:  no     Comments:  Pts friend is calling she states she has been having calls in regards to a brace and shoes and she is asking if this is something the office has ordered for the pt

## 2024-03-30 ENCOUNTER — LAB VISIT (OUTPATIENT)
Dept: LAB | Facility: HOSPITAL | Age: 74
End: 2024-03-30
Attending: INTERNAL MEDICINE
Payer: MEDICARE

## 2024-03-30 DIAGNOSIS — E55.9 VITAMIN D DEFICIENCY DISEASE: ICD-10-CM

## 2024-03-30 DIAGNOSIS — Z12.5 SCREENING PSA (PROSTATE SPECIFIC ANTIGEN): ICD-10-CM

## 2024-03-30 DIAGNOSIS — E10.8 TYPE 1 DIABETES MELLITUS WITH COMPLICATION: ICD-10-CM

## 2024-03-30 DIAGNOSIS — E53.8 VITAMIN B12 DEFICIENCY: ICD-10-CM

## 2024-03-30 LAB
25(OH)D3+25(OH)D2 SERPL-MCNC: 63 NG/ML (ref 30–96)
ALBUMIN SERPL BCP-MCNC: 3.4 G/DL (ref 3.5–5.2)
ALP SERPL-CCNC: 88 U/L (ref 55–135)
ALT SERPL W/O P-5'-P-CCNC: 43 U/L (ref 10–44)
ANION GAP SERPL CALC-SCNC: 10 MMOL/L (ref 8–16)
AST SERPL-CCNC: 27 U/L (ref 10–40)
BASOPHILS # BLD AUTO: 0.03 K/UL (ref 0–0.2)
BASOPHILS NFR BLD: 0.7 % (ref 0–1.9)
BILIRUB SERPL-MCNC: 0.3 MG/DL (ref 0.1–1)
BUN SERPL-MCNC: 17 MG/DL (ref 8–23)
CALCIUM SERPL-MCNC: 9.2 MG/DL (ref 8.7–10.5)
CHLORIDE SERPL-SCNC: 104 MMOL/L (ref 95–110)
CHOLEST SERPL-MCNC: 129 MG/DL (ref 120–199)
CHOLEST/HDLC SERPL: 2.5 {RATIO} (ref 2–5)
CO2 SERPL-SCNC: 26 MMOL/L (ref 23–29)
COMPLEXED PSA SERPL-MCNC: 0.3 NG/ML (ref 0–4)
CREAT SERPL-MCNC: 0.8 MG/DL (ref 0.5–1.4)
DIFFERENTIAL METHOD BLD: ABNORMAL
EOSINOPHIL # BLD AUTO: 0.3 K/UL (ref 0–0.5)
EOSINOPHIL NFR BLD: 6.2 % (ref 0–8)
ERYTHROCYTE [DISTWIDTH] IN BLOOD BY AUTOMATED COUNT: 12.6 % (ref 11.5–14.5)
EST. GFR  (NO RACE VARIABLE): >60 ML/MIN/1.73 M^2
ESTIMATED AVG GLUCOSE: 203 MG/DL (ref 68–131)
GLUCOSE SERPL-MCNC: 79 MG/DL (ref 70–110)
HBA1C MFR BLD: 8.7 % (ref 4–5.6)
HCT VFR BLD AUTO: 37.9 % (ref 40–54)
HDLC SERPL-MCNC: 52 MG/DL (ref 40–75)
HDLC SERPL: 40.3 % (ref 20–50)
HGB BLD-MCNC: 12.3 G/DL (ref 14–18)
IMM GRANULOCYTES # BLD AUTO: 0.01 K/UL (ref 0–0.04)
IMM GRANULOCYTES NFR BLD AUTO: 0.2 % (ref 0–0.5)
LDLC SERPL CALC-MCNC: 60.2 MG/DL (ref 63–159)
LYMPHOCYTES # BLD AUTO: 1.4 K/UL (ref 1–4.8)
LYMPHOCYTES NFR BLD: 32.3 % (ref 18–48)
MCH RBC QN AUTO: 30.4 PG (ref 27–31)
MCHC RBC AUTO-ENTMCNC: 32.5 G/DL (ref 32–36)
MCV RBC AUTO: 94 FL (ref 82–98)
MONOCYTES # BLD AUTO: 0.7 K/UL (ref 0.3–1)
MONOCYTES NFR BLD: 15.9 % (ref 4–15)
NEUTROPHILS # BLD AUTO: 1.9 K/UL (ref 1.8–7.7)
NEUTROPHILS NFR BLD: 44.7 % (ref 38–73)
NONHDLC SERPL-MCNC: 77 MG/DL
NRBC BLD-RTO: 0 /100 WBC
PLATELET # BLD AUTO: 261 K/UL (ref 150–450)
PMV BLD AUTO: 11.4 FL (ref 9.2–12.9)
POTASSIUM SERPL-SCNC: 4.7 MMOL/L (ref 3.5–5.1)
PROT SERPL-MCNC: 6.7 G/DL (ref 6–8.4)
RBC # BLD AUTO: 4.04 M/UL (ref 4.6–6.2)
SODIUM SERPL-SCNC: 140 MMOL/L (ref 136–145)
TRIGL SERPL-MCNC: 84 MG/DL (ref 30–150)
TSH SERPL DL<=0.005 MIU/L-ACNC: 3.12 UIU/ML (ref 0.4–4)
VIT B12 SERPL-MCNC: 1590 PG/ML (ref 210–950)
WBC # BLD AUTO: 4.34 K/UL (ref 3.9–12.7)

## 2024-03-30 PROCEDURE — 82607 VITAMIN B-12: CPT | Performed by: INTERNAL MEDICINE

## 2024-03-30 PROCEDURE — 84153 ASSAY OF PSA TOTAL: CPT | Performed by: INTERNAL MEDICINE

## 2024-03-30 PROCEDURE — 80061 LIPID PANEL: CPT | Performed by: INTERNAL MEDICINE

## 2024-03-30 PROCEDURE — 80053 COMPREHEN METABOLIC PANEL: CPT | Performed by: INTERNAL MEDICINE

## 2024-03-30 PROCEDURE — 85025 COMPLETE CBC W/AUTO DIFF WBC: CPT | Performed by: INTERNAL MEDICINE

## 2024-03-30 PROCEDURE — 82306 VITAMIN D 25 HYDROXY: CPT | Performed by: INTERNAL MEDICINE

## 2024-03-30 PROCEDURE — 83036 HEMOGLOBIN GLYCOSYLATED A1C: CPT | Performed by: INTERNAL MEDICINE

## 2024-03-30 PROCEDURE — 36415 COLL VENOUS BLD VENIPUNCTURE: CPT | Performed by: INTERNAL MEDICINE

## 2024-03-30 PROCEDURE — 84443 ASSAY THYROID STIM HORMONE: CPT | Performed by: INTERNAL MEDICINE

## 2024-04-04 ENCOUNTER — OFFICE VISIT (OUTPATIENT)
Dept: INTERNAL MEDICINE | Facility: CLINIC | Age: 74
End: 2024-04-04
Payer: MEDICARE

## 2024-04-04 VITALS
HEART RATE: 72 BPM | WEIGHT: 156 LBS | SYSTOLIC BLOOD PRESSURE: 96 MMHG | OXYGEN SATURATION: 99 % | HEIGHT: 72 IN | DIASTOLIC BLOOD PRESSURE: 68 MMHG | BODY MASS INDEX: 21.13 KG/M2

## 2024-04-04 DIAGNOSIS — F01.50 VASCULAR DEMENTIA, UNCOMPLICATED: ICD-10-CM

## 2024-04-04 DIAGNOSIS — R76.8 HEPATITIS C ANTIBODY POSITIVE IN BLOOD: ICD-10-CM

## 2024-04-04 DIAGNOSIS — E11.3299 BACKGROUND DIABETIC RETINOPATHY: ICD-10-CM

## 2024-04-04 DIAGNOSIS — G40.909 SEIZURE DISORDER: ICD-10-CM

## 2024-04-04 DIAGNOSIS — J43.9 PULMONARY EMPHYSEMA, UNSPECIFIED EMPHYSEMA TYPE: ICD-10-CM

## 2024-04-04 DIAGNOSIS — F31.9 BIPOLAR AFFECTIVE DISORDER, REMISSION STATUS UNSPECIFIED: ICD-10-CM

## 2024-04-04 DIAGNOSIS — E10.8 TYPE 1 DIABETES MELLITUS WITH COMPLICATION: Primary | ICD-10-CM

## 2024-04-04 DIAGNOSIS — K21.9 GASTROESOPHAGEAL REFLUX DISEASE, UNSPECIFIED WHETHER ESOPHAGITIS PRESENT: ICD-10-CM

## 2024-04-04 PROCEDURE — 99999 PR PBB SHADOW E&M-EST. PATIENT-LVL IV: CPT | Mod: PBBFAC,,, | Performed by: INTERNAL MEDICINE

## 2024-04-04 PROCEDURE — 99214 OFFICE O/P EST MOD 30 MIN: CPT | Mod: S$GLB,,, | Performed by: INTERNAL MEDICINE

## 2024-04-04 PROCEDURE — 4010F ACE/ARB THERAPY RXD/TAKEN: CPT | Mod: CPTII,S$GLB,, | Performed by: INTERNAL MEDICINE

## 2024-04-04 PROCEDURE — 3074F SYST BP LT 130 MM HG: CPT | Mod: CPTII,S$GLB,, | Performed by: INTERNAL MEDICINE

## 2024-04-04 PROCEDURE — 3078F DIAST BP <80 MM HG: CPT | Mod: CPTII,S$GLB,, | Performed by: INTERNAL MEDICINE

## 2024-04-04 PROCEDURE — 1101F PT FALLS ASSESS-DOCD LE1/YR: CPT | Mod: CPTII,S$GLB,, | Performed by: INTERNAL MEDICINE

## 2024-04-04 PROCEDURE — 3288F FALL RISK ASSESSMENT DOCD: CPT | Mod: CPTII,S$GLB,, | Performed by: INTERNAL MEDICINE

## 2024-04-04 PROCEDURE — 1157F ADVNC CARE PLAN IN RCRD: CPT | Mod: CPTII,S$GLB,, | Performed by: INTERNAL MEDICINE

## 2024-04-04 PROCEDURE — 1159F MED LIST DOCD IN RCRD: CPT | Mod: CPTII,S$GLB,, | Performed by: INTERNAL MEDICINE

## 2024-04-04 PROCEDURE — 3008F BODY MASS INDEX DOCD: CPT | Mod: CPTII,S$GLB,, | Performed by: INTERNAL MEDICINE

## 2024-04-04 PROCEDURE — 3052F HG A1C>EQUAL 8.0%<EQUAL 9.0%: CPT | Mod: CPTII,S$GLB,, | Performed by: INTERNAL MEDICINE

## 2024-04-04 PROCEDURE — 1126F AMNT PAIN NOTED NONE PRSNT: CPT | Mod: CPTII,S$GLB,, | Performed by: INTERNAL MEDICINE

## 2024-04-04 RX ORDER — MUPIROCIN 20 MG/G
OINTMENT TOPICAL 2 TIMES DAILY
Qty: 60 G | Refills: 3 | Status: SHIPPED | OUTPATIENT
Start: 2024-04-04

## 2024-04-04 NOTE — PROGRESS NOTES
CHIEF COMPLAINT: Follow up of multiple issues  HISTORY OF PRESENT ILLNESS: 73-year-old man presents for follow up of above     He has been doing well. He lives with his Costa Ledesma ([phone]), who helps him with his medications and meals. Most of the history is obtained from Costa due to his dementia. He is happy living with Costa. He is eating well.       HE is now taking ashwagandha (orgainic ashwagandha 380 mg plus ashwagandha extract 95 mg) one capsule once daily and Holy Basil Leaf (holy basil leaf extract 292 mg and holy basil leaf supercritical extract 190 mg) one capsule once daily.  HE is also taking Melatonin 10 mg 2  times daily which helps calm him. He is sleeping well. He has less movement of arms, legs, tongue.  he is sitting still and can hold a short conversation.     Mood is better on the supplements. HE is calmer.  Disposition is ok.  Memory is about 5 minutes Swallowing is better. No chocking.  He no longer worries about things.   HE has a sense of humor.       HE is taking olanzapine 2.5 mg twice daily, prozac 40 mg daily, depakote 250 mg in am and 500 mg in the evening and cogentin 0.5 mg twice daily for his mood and seizure disorder. Mood is good He has not had any seizures.  He is not paranoid.  He sleeps well.  No depression. He is possibly taking olanzapine 2.5 mg twice daily      He is currently taking Lantus 18-20 units in the evening and Novolog 5-10  units plus sliding scale before meals. Blood sugars have been better 120-150.   No fever, chills, nausea, voimiting, constipation, diarrhea. No significant hypoglycemia     No dysuria, hematuria, nocturia, straining or incomplete void, chest pain, shortness of breath, rhinorrhea, headache, blurred vision, hearing loss. He continues to take Crestor 10 mg daily for his hyperlipidemia. He denies any joint pain, muscle pain. He continues to take enalapril 20 mg twice daily and Norvasc 10 mg daily for his hypertension. He is on omeprazole 20  mg once daily for reflux and has not had any reflux symptoms.              REVIEW OF SYSTEMS: She denies fevers, chills, night sweats, fatigue, visual change, hearing loss, sinus congestion, sore throat, chest pain, shortness of breath, nausea, vomiting, constipation, diarrhea, dysuria, hematuria, polydipsia, polyuria, joint pain, muscle pain, headaches.        PAST MEDICAL HISTORY:   1. Vascular dementia.   2. Type I diabetes.   3. Seizure disorder.   4. History of polysubstance abuse.   5. Recovered alcoholic   6. Bipolar disorder.   7. History of renal insufficiency.   8. Hepatitis C.   9. History of left a left upper extremity DVT January 2007 after an IV.   10. First cervical spine arthritis on C6-C7 on x-ray.   11. Hospitalization July 2008 due to ingestion of caustic substance, which caused erosive esophagitis.    12. History of her aspiration during hospitalization 7/2008.     MEDICATIONS and ALLERGIES: Updated on epic.     PHYSICAL EXAMINATION:      BP 96/68 (BP Location: Right arm, Patient Position: Sitting, BP Method: Large (Manual))   Pulse 72   Ht 6' (1.829 m)   Wt 70.8 kg (156 lb)   SpO2 99%   BMI 21.16 kg/m²     General: Alert, oriented. No apparent distress. Affect within normal limits.   Conjunctivae anicteric. Tympanic membranes clear. Oropharynx clear. Poor dentition  Neck supple.   Respiratory effort normal. Lungs clear bilaterally. Good air flow  Heart: Regular rate and rhythm without murmurs, gallops or rubs.   No lower extremity edema.   Abdomen: Soft, nondistended, nontender. Bowel sounds present. No   hepatosplenomegaly.                 labs 3/30/24 reviewed     ASSESSMENT AND PLAN:     \  1. Type I diabetes -  controlled  2. Hypertension - controlled.   3. Hyperlipidemia - controlled  4. Hepatitis C -saw hepatology 10/2018. Hepatitis C RNA negative.   5. Bipolar disorder with vascular dementia.  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 Seizure disorder - asx  Screening -  . Pt declines colonscopy. Cologuard- care giver will not fill out.   10. Movement disorder - better with supplements  11. Cannot do colonoscopy prep  12. 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

## 2024-04-12 ENCOUNTER — TELEPHONE (OUTPATIENT)
Dept: INTERNAL MEDICINE | Facility: CLINIC | Age: 74
End: 2024-04-12
Payer: MEDICARE

## 2024-04-12 NOTE — TELEPHONE ENCOUNTER
----- Message from Stella Dhillon sent at 4/12/2024  4:00 PM CDT -----  Contact: [phone] Costa  Patient would like to get medical advice.  Symptoms (please be specific):   Costa is requesting the pt AVS from his 04/04/24 appt be mailed to  [address] [place] [address]      How long have you had these symptoms: N/A  Would you like a call back, or a response through your [facility] portal?:   call back   Pharmacy name and phone # (copy from chart): N/A    Comments:

## 2024-06-10 ENCOUNTER — PATIENT MESSAGE (OUTPATIENT)
Dept: INTERNAL MEDICINE | Facility: CLINIC | Age: 74
End: 2024-06-10
Payer: MEDICARE

## 2024-08-19 DIAGNOSIS — G25.5 OTHER CHOREA: ICD-10-CM

## 2024-08-19 NOTE — TELEPHONE ENCOUNTER
Care Due:                  Date            Visit Type   Department     Provider  --------------------------------------------------------------------------------                                EP -                              PRIMARY      [facility] INTERNAL  Last Visit: 04-      CARE ([facility])   MEDICINE       MONICA SCHULTE                              [facility]                              PRIMARY      [facility] INTERNAL  Next Visit: 10-      CARE ([facility])   MEDICINE       MONICA SCHULTE                                                            Last  Test          Frequency    Reason                     Performed    Due Date  --------------------------------------------------------------------------------    HBA1C.......  6 months...  insulin..................  03- 09-    Valproic      12 months..  divalproex...............  Not Found    Overdue  Acid (serum)    [employer] Embedded Care Due Messages. Reference number: [id number].   8/19/2024 11:15:06 AM CDT

## 2024-08-20 RX ORDER — OLANZAPINE 2.5 MG/1
TABLET ORAL
Qty: 60 TABLET | Refills: 11 | Status: SHIPPED | OUTPATIENT
Start: 2024-08-20

## 2024-08-20 NOTE — TELEPHONE ENCOUNTER
Refill Routing Note   Medication(s) are not appropriate for processing by [facility] for the following reason(s):        Outside of protocol    [facility] action(s):  Route     Requires labs : Yes             Appointments  past 12m or future 3m with PCP    Date Provider   Last Visit   4/4/2024 Gemma Boone MD   Next Visit   10/11/2024 Gemma Boone MD   ED visits in past 90 days: 0        Note composed:2:39 AM 08/20/2024

## 2024-09-23 DIAGNOSIS — E10.8 TYPE 1 DIABETES MELLITUS WITH COMPLICATION: ICD-10-CM

## 2024-09-23 DIAGNOSIS — I10 HYPERTENSION, UNSPECIFIED TYPE: ICD-10-CM

## 2024-09-23 RX ORDER — FLUOXETINE HYDROCHLORIDE 40 MG/1
40 CAPSULE ORAL
Qty: 90 CAPSULE | Refills: 1 | Status: SHIPPED | OUTPATIENT
Start: 2024-09-23

## 2024-09-23 RX ORDER — INSULIN GLARGINE 100 [IU]/ML
20 INJECTION, SOLUTION SUBCUTANEOUS
Qty: 15 ML | Refills: 0 | Status: SHIPPED | OUTPATIENT
Start: 2024-09-23

## 2024-09-23 RX ORDER — ENALAPRIL MALEATE 20 MG/1
20 TABLET ORAL 2 TIMES DAILY
Qty: 180 TABLET | Refills: 1 | Status: SHIPPED | OUTPATIENT
Start: 2024-09-23

## 2024-09-23 NOTE — TELEPHONE ENCOUNTER
No care due was identified.  [facility] Embedded Care Due Messages. Reference number: [id number].   9/23/2024 10:18:45 AM CDT

## 2024-09-24 NOTE — TELEPHONE ENCOUNTER
Refill Routing Note   Medication(s) are not appropriate for processing by [facility] for the following reason(s):        Drug-disease interaction    [facility] action(s):  Defer  Approve        Medication Therapy Plan: Drug-Disease: FLUoxetine and Bipolar disorder    Pharmacist review requested: Yes     Appointments  past 12m or future 3m with PCP    Date Provider   Last Visit   4/4/2024 Gemma Boone MD   Next Visit   10/11/2024 Gemma Boone MD   ED visits in past 90 days: 0        Note composed:7:57 PM 09/23/2024

## 2024-09-24 NOTE — TELEPHONE ENCOUNTER
Refill Decision Note   Layo Haney  is requesting a refill authorization.  Brief Assessment and Rationale for Refill:  Approve     Medication Therapy Plan:        Pharmacist review requested: Yes   Comments:     Note composed:9:47 PM 09/23/2024

## 2024-10-09 ENCOUNTER — LAB VISIT (OUTPATIENT)
Dept: LAB | Facility: HOSPITAL | Age: 74
End: 2024-10-09
Attending: INTERNAL MEDICINE
Payer: MEDICARE

## 2024-10-09 DIAGNOSIS — E10.8 TYPE 1 DIABETES MELLITUS WITH COMPLICATION: ICD-10-CM

## 2024-10-09 LAB
ALBUMIN SERPL BCP-MCNC: 3.4 G/DL (ref 3.5–5.2)
ALP SERPL-CCNC: 123 U/L (ref 55–135)
ALT SERPL W/O P-5'-P-CCNC: 52 U/L (ref 10–44)
ANION GAP SERPL CALC-SCNC: 12 MMOL/L (ref 8–16)
AST SERPL-CCNC: 27 U/L (ref 10–40)
BASOPHILS # BLD AUTO: 0.03 K/UL (ref 0–0.2)
BASOPHILS NFR BLD: 0.5 % (ref 0–1.9)
BILIRUB SERPL-MCNC: 0.4 MG/DL (ref 0.1–1)
BUN SERPL-MCNC: 21 MG/DL (ref 8–23)
CALCIUM SERPL-MCNC: 9.5 MG/DL (ref 8.7–10.5)
CHLORIDE SERPL-SCNC: 99 MMOL/L (ref 95–110)
CO2 SERPL-SCNC: 26 MMOL/L (ref 23–29)
CREAT SERPL-MCNC: 1.2 MG/DL (ref 0.5–1.4)
DIFFERENTIAL METHOD BLD: ABNORMAL
EOSINOPHIL # BLD AUTO: 0.3 K/UL (ref 0–0.5)
EOSINOPHIL NFR BLD: 4.4 % (ref 0–8)
ERYTHROCYTE [DISTWIDTH] IN BLOOD BY AUTOMATED COUNT: 13.1 % (ref 11.5–14.5)
EST. GFR  (NO RACE VARIABLE): >60 ML/MIN/1.73 M^2
ESTIMATED AVG GLUCOSE: 226 MG/DL (ref 68–131)
GLUCOSE SERPL-MCNC: 447 MG/DL (ref 70–110)
HBA1C MFR BLD: 9.5 % (ref 4–5.6)
HCT VFR BLD AUTO: 42.5 % (ref 40–54)
HGB BLD-MCNC: 13.5 G/DL (ref 14–18)
IMM GRANULOCYTES # BLD AUTO: 0.02 K/UL (ref 0–0.04)
IMM GRANULOCYTES NFR BLD AUTO: 0.3 % (ref 0–0.5)
LYMPHOCYTES # BLD AUTO: 1.8 K/UL (ref 1–4.8)
LYMPHOCYTES NFR BLD: 27.5 % (ref 18–48)
MCH RBC QN AUTO: 29.4 PG (ref 27–31)
MCHC RBC AUTO-ENTMCNC: 31.8 G/DL (ref 32–36)
MCV RBC AUTO: 93 FL (ref 82–98)
MONOCYTES # BLD AUTO: 0.7 K/UL (ref 0.3–1)
MONOCYTES NFR BLD: 9.8 % (ref 4–15)
NEUTROPHILS # BLD AUTO: 3.8 K/UL (ref 1.8–7.7)
NEUTROPHILS NFR BLD: 57.5 % (ref 38–73)
NRBC BLD-RTO: 0 /100 WBC
PLATELET # BLD AUTO: 234 K/UL (ref 150–450)
PMV BLD AUTO: 12.1 FL (ref 9.2–12.9)
POTASSIUM SERPL-SCNC: 5.2 MMOL/L (ref 3.5–5.1)
PROT SERPL-MCNC: 7.4 G/DL (ref 6–8.4)
RBC # BLD AUTO: 4.59 M/UL (ref 4.6–6.2)
SODIUM SERPL-SCNC: 137 MMOL/L (ref 136–145)
WBC # BLD AUTO: 6.66 K/UL (ref 3.9–12.7)

## 2024-10-09 PROCEDURE — 83036 HEMOGLOBIN GLYCOSYLATED A1C: CPT | Performed by: INTERNAL MEDICINE

## 2024-10-09 PROCEDURE — 80053 COMPREHEN METABOLIC PANEL: CPT | Performed by: INTERNAL MEDICINE

## 2024-10-09 PROCEDURE — 85025 COMPLETE CBC W/AUTO DIFF WBC: CPT | Performed by: INTERNAL MEDICINE

## 2024-10-11 ENCOUNTER — IMMUNIZATION (OUTPATIENT)
Dept: INTERNAL MEDICINE | Facility: CLINIC | Age: 74
End: 2024-10-11
Payer: MEDICARE

## 2024-10-11 ENCOUNTER — OFFICE VISIT (OUTPATIENT)
Dept: INTERNAL MEDICINE | Facility: CLINIC | Age: 74
End: 2024-10-11
Payer: MEDICARE

## 2024-10-11 VITALS
BODY MASS INDEX: 22.44 KG/M2 | SYSTOLIC BLOOD PRESSURE: 132 MMHG | HEIGHT: 72 IN | WEIGHT: 165.69 LBS | DIASTOLIC BLOOD PRESSURE: 60 MMHG | HEART RATE: 75 BPM | OXYGEN SATURATION: 99 %

## 2024-10-11 DIAGNOSIS — Z23 NEED FOR VACCINATION: Primary | ICD-10-CM

## 2024-10-11 DIAGNOSIS — Z12.5 SCREENING PSA (PROSTATE SPECIFIC ANTIGEN): ICD-10-CM

## 2024-10-11 DIAGNOSIS — Z00.00 ROUTINE GENERAL MEDICAL EXAMINATION AT A HEALTH CARE FACILITY: Primary | ICD-10-CM

## 2024-10-11 DIAGNOSIS — E10.8 TYPE 1 DIABETES MELLITUS WITH COMPLICATION: ICD-10-CM

## 2024-10-11 DIAGNOSIS — E55.9 VITAMIN D DEFICIENCY DISEASE: ICD-10-CM

## 2024-10-11 DIAGNOSIS — R76.8 HEPATITIS C ANTIBODY POSITIVE IN BLOOD: ICD-10-CM

## 2024-10-11 PROCEDURE — 99999 PR PBB SHADOW E&M-EST. PATIENT-LVL II: CPT | Mod: PBBFAC,,, | Performed by: INTERNAL MEDICINE

## 2024-10-11 NOTE — PROGRESS NOTES
CHIEF COMPLAINT: Annual exam and Follow up of multiple issues  HISTORY OF PRESENT ILLNESS: 74-year-old man presents for follow up of above     He has been doing well. He lives with his Costa Ledesma ([phone]), who helps him with his medications and meals. Most of the history is obtained from Costa due to his dementia. He is happy living with Costa. He is eating well.       HE is now taking ashwagandha (orgainic ashwagandha 380 mg plus ashwagandha extract 95 mg) one capsule once daily and Holy Basil Leaf (holy basil leaf extract 292 mg and holy basil leaf supercritical extract 190 mg) one capsule once daily.  HE is also taking Melatonin 10 mg 2  times daily which helps calm him. He is sleeping well. He has less movement of arms, legs, tongue.  he is sitting still and can hold a short conversation.     Mood is better on the supplements. HE is calmer.  Disposition is ok.  Memory is about 5 minutes Swallowing is better. No chocking.  He no longer worries about things.   HE has a sense of humor.      Appetite is good. He has gained 9 pounds since our last visit.      HE is taking olanzapine 2.5 mg twice daily, prozac 40 mg daily, depakote 250 mg in am and 500 mg in the evening and cogentin 0.5 mg twice daily for his mood and seizure disorder. Mood is good He has not had any seizures.  He is not paranoid.  He sleeps well.  No depression. He is possibly taking olanzapine 2.5 mg twice daily      He is currently taking Lantus 18-20 units in the evening and Novolog 5-10  units plus sliding scale before meals. Blood sugars have been better 120-150.   No fever, chills, nausea, voimiting, constipation, diarrhea. No significant hypoglycemia     No dysuria, hematuria, nocturia, straining or incomplete void, chest pain, shortness of breath, rhinorrhea, headache, blurred vision, hearing loss. He continues to take Crestor 10 mg daily for his hyperlipidemia. He denies any joint pain, muscle pain. He continues to take enalapril 20  mg twice daily and Norvasc 10 mg daily for his hypertension. He is on omeprazole 20 mg once daily for reflux and has not had any reflux symptoms.              REVIEW OF SYSTEMS: She denies fevers, chills, night sweats, fatigue, visual change, hearing loss, sinus congestion, sore throat, chest pain, shortness of breath, nausea, vomiting, constipation, diarrhea, dysuria, hematuria, polydipsia, polyuria, joint pain, muscle pain, headaches.        PAST MEDICAL HISTORY:   1. Vascular dementia.   2. Type I diabetes.   3. Seizure disorder.   4. History of polysubstance abuse.   5. Recovered alcoholic   6. Bipolar disorder.   7. History of renal insufficiency.   8. Hepatitis C.   9. History of left a left upper extremity DVT January 2007 after an IV.   10. First cervical spine arthritis on C6-C7 on x-ray.   11. Hospitalization July 2008 due to ingestion of caustic substance, which caused erosive esophagitis.    12. History of her aspiration during hospitalization 7/2008.     MEDICATIONS and ALLERGIES: Updated on epic.     PHYSICAL EXAMINATION:       /60   Pulse 75   Ht 6' (1.829 m)   Wt 75.1 kg (165 lb 10.8 oz)   SpO2 99%   BMI 22.47 kg/m²     General: Alert, oriented. No apparent distress. Affect within normal limits.   Conjunctivae anicteric. Tympanic membranes clear. Oropharynx clear. Poor dentition  Neck supple.   Respiratory effort normal. Lungs clear bilaterally. Good air flow  Heart: Regular rate and rhythm without murmurs, gallops or rubs.   No lower extremity edema.   Abdomen: Soft, nondistended, nontender. Bowel sounds present. No   hepatosplenomegaly.      Protective Sensation (w/ 10 gram monofilament):  Right: Decreased  Left: Decreased    Visual Inspection:  Onychomycosis -  Bilateral    Pedal Pulses:   Right: Present  Left: Present    Posterior Tibialis Pulses:   Right:Present  Left: Present             labs 10/9/24 reviewed     ASSESSMENT AND PLAN:      Annual exam - discussed diet, exercise and  safety issues.     \  1. Type I diabetes -  controlled  2. Hypertension - controlled.   3. Hyperlipidemia - controlled  4. Hepatitis C -saw hepatology 10/2018. Hepatitis C RNA negative.   5. Bipolar disorder with vascular dementia.  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 Seizure disorder - asx  Screening - . Pt declines colonscopy. Cologuard- care giver will not fill out.   10. Movement disorder - better with supplements  11. Cannot do colonoscopy prep  12. 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

## 2024-11-11 RX ORDER — AMLODIPINE BESYLATE 10 MG/1
10 TABLET ORAL DAILY
Qty: 90 TABLET | Refills: 3 | Status: SHIPPED | OUTPATIENT
Start: 2024-11-11

## 2024-11-11 RX ORDER — OMEPRAZOLE 20 MG/1
20 CAPSULE, DELAYED RELEASE ORAL DAILY
Qty: 90 CAPSULE | Refills: 3 | Status: SHIPPED | OUTPATIENT
Start: 2024-11-11

## 2024-11-11 RX ORDER — ROSUVASTATIN CALCIUM 10 MG/1
10 TABLET, COATED ORAL NIGHTLY
Qty: 90 TABLET | Refills: 1 | Status: SHIPPED | OUTPATIENT
Start: 2024-11-11

## 2024-11-11 NOTE — TELEPHONE ENCOUNTER
Refill Decision Note   Layo Haney  is requesting a refill authorization.  Brief Assessment and Rationale for Refill:  Approve     Medication Therapy Plan:        Comments:     Note composed:4:00 PM 11/11/2024

## 2024-11-11 NOTE — TELEPHONE ENCOUNTER
----- Message from Erica sent at 11/11/2024 12:17 PM CST -----  Contact: [facility] [phone]  Requesting an RX refill or new RX.    Is this a refill or new RX: REF    RX name and strength (copy/paste from chart):  amLODIPine (NORVASC) 10 MG tablet    Is this a 30 day or 90 day RX:     Pharmacy name and phone # (copy/paste from chart):    [facility] - [place], [place] - [address]  [address] [address]  Phone: [phone] Fax: [phone]   ____________________________________________  s this a refill or new RX: REF    RX name and strength (copy/paste from chart):  Disp Refills Start End   omeprazole (PRILOSEC) 20 MG capsule         _____________________________________________________________________    s this a refill or new RX: REF    RX name and strength (copy/paste from chart): rosuvastatin (CRESTOR) 10 MG tablet

## 2024-11-11 NOTE — TELEPHONE ENCOUNTER
No care due was identified.  [facility] Embedded Care Due Messages. Reference number: [id number].   11/11/2024 11:11:52 AM CST

## 2024-12-13 DIAGNOSIS — E10.9 TYPE 1 DIABETES MELLITUS WITHOUT COMPLICATION: ICD-10-CM

## 2024-12-13 DIAGNOSIS — E10.8 TYPE 1 DIABETES MELLITUS WITH COMPLICATION: ICD-10-CM

## 2024-12-13 RX ORDER — BLOOD SUGAR DIAGNOSTIC
STRIP MISCELLANEOUS
Qty: 500 STRIP | Refills: 3 | Status: SHIPPED | OUTPATIENT
Start: 2024-12-13

## 2024-12-13 RX ORDER — INSULIN GLARGINE 100 [IU]/ML
20 INJECTION, SOLUTION SUBCUTANEOUS
Qty: 15 ML | Refills: 1 | Status: SHIPPED | OUTPATIENT
Start: 2024-12-13

## 2024-12-13 NOTE — TELEPHONE ENCOUNTER
No care due was identified.  [facility] Embedded Care Due Messages. Reference number: [id number].   12/13/2024 12:02:34 PM CST

## 2024-12-14 NOTE — TELEPHONE ENCOUNTER
Refill Decision Note   Layo Haney  is requesting a refill authorization.  Brief Assessment and Rationale for Refill:  Approve     Medication Therapy Plan:        Comments:     Note composed:8:08 PM 12/13/2024

## 2025-01-20 DIAGNOSIS — I10 HYPERTENSION, UNSPECIFIED TYPE: ICD-10-CM

## 2025-01-20 NOTE — TELEPHONE ENCOUNTER
Care Due:                  Date            Visit Type   Department     Provider  --------------------------------------------------------------------------------                                EP -                              PRIMARY      [facility] INTERNAL  Last Visit: 10-      CARE ([facility])   MEDICINE       MONICA SCHULTE                              [facility]                              PRIMARY      [facility] INTERNAL  Next Visit: 04-      CARE ([facility])   MEDICINE       MONICA SCHULTE                                                            Last  Test          Frequency    Reason                     Performed    Due Date  --------------------------------------------------------------------------------    HBA1C.......  6 months...  insulin..................  10-   04-    Lipid Panel.  12 months..  rosuvastatin.............  03- 03-    Valproic      12 months..  divalproex...............  Not Found    Overdue  Acid (serum)    [employer] Embedded Care Due Messages. Reference number: [id number].   1/20/2025 8:31:30 AM CST

## 2025-01-21 RX ORDER — BENZTROPINE MESYLATE 0.5 MG/1
0.5 TABLET ORAL 2 TIMES DAILY
Qty: 180 TABLET | Refills: 4 | Status: SHIPPED | OUTPATIENT
Start: 2025-01-21

## 2025-01-21 RX ORDER — ENALAPRIL MALEATE 20 MG/1
20 TABLET ORAL 2 TIMES DAILY
Qty: 180 TABLET | Refills: 4 | Status: SHIPPED | OUTPATIENT
Start: 2025-01-21

## 2025-01-21 RX ORDER — FLUOXETINE HYDROCHLORIDE 40 MG/1
40 CAPSULE ORAL
Qty: 90 CAPSULE | Refills: 2 | Status: SHIPPED | OUTPATIENT
Start: 2025-01-21

## 2025-01-21 NOTE — TELEPHONE ENCOUNTER
Refill Routing Note   Medication(s) are not appropriate for processing by [facility] for the following reason(s):        Required labs abnormal  Outside of protocol    [facility] action(s):  Approve  Defer  Route        Medication Therapy Plan: FLOS      Appointments  past 12m or future 3m with PCP    Date Provider   Last Visit   10/11/2024 Gemma Boone MD   Next Visit   4/11/2025 Gemma Boone MD   ED visits in past 90 days: 0        Note composed:2:08 PM 01/21/2025

## 2025-02-27 DIAGNOSIS — E10.8 TYPE 1 DIABETES MELLITUS WITH COMPLICATION: ICD-10-CM

## 2025-02-27 RX ORDER — PEN NEEDLE, DIABETIC 30 GX3/16"
NEEDLE, DISPOSABLE MISCELLANEOUS
Qty: 400 EACH | Refills: 3 | Status: SHIPPED | OUTPATIENT
Start: 2025-02-27

## 2025-02-27 NOTE — TELEPHONE ENCOUNTER
Refill Decision Note   Layo Haney  is requesting a refill authorization.  Brief Assessment and Rationale for Refill:  Approve     Medication Therapy Plan:        Comments:     Note composed:12:28 PM 02/27/2025

## 2025-02-27 NOTE — TELEPHONE ENCOUNTER
No care due was identified.  [facility] Embedded Care Due Messages. Reference number: [id number].   2/27/2025 11:58:30 AM CST

## 2025-03-11 DIAGNOSIS — E10.8 TYPE 1 DIABETES MELLITUS WITH COMPLICATION: ICD-10-CM

## 2025-03-11 RX ORDER — INSULIN ASPART 100 [IU]/ML
INJECTION, SOLUTION INTRAVENOUS; SUBCUTANEOUS
Qty: 30 ML | Refills: 1 | Status: SHIPPED | OUTPATIENT
Start: 2025-03-11

## 2025-03-11 NOTE — TELEPHONE ENCOUNTER
No care due was identified.  [facility] Embedded Care Due Messages. Reference number: [id number].   3/11/2025 10:31:42 AM CDT

## 2025-03-12 NOTE — TELEPHONE ENCOUNTER
Refill Routing Note   Medication(s) are not appropriate for processing by [facility] for the following reason(s):      Medication outside of protocol    [facility] action(s):  Route Care Due:  None identified     Medication Therapy Plan: sliding scale is outside of protocol      Appointments  past 12m or future 3m with PCP    Date Provider   Last Visit   10/11/2024 Gemma Boone MD   Next Visit   4/11/2025 Gemma Boone MD   ED visits in past 90 days: 0        Note composed:8:52 PM 03/11/2025

## 2025-04-04 ENCOUNTER — HOSPITAL ENCOUNTER (OUTPATIENT)
Dept: RADIOLOGY | Facility: HOSPITAL | Age: 75
Discharge: HOME | End: 2025-04-04
Attending: INTERNAL MEDICINE
Payer: MEDICARE

## 2025-04-04 DIAGNOSIS — F17.211 CIGARETTE NICOTINE DEPENDENCE IN REMISSION: ICD-10-CM

## 2025-04-04 PROCEDURE — 71271 CT THORAX LUNG CANCER SCR C-: CPT | Mod: TC

## 2025-04-04 PROCEDURE — 71271 CT THORAX LUNG CANCER SCR C-: CPT | Mod: 26,,, | Performed by: STUDENT IN AN ORGANIZED HEALTH CARE EDUCATION/TRAINING PROGRAM

## 2025-04-11 ENCOUNTER — OFFICE VISIT (OUTPATIENT)
Dept: INTERNAL MEDICINE | Facility: CLINIC | Age: 75
End: 2025-04-11
Payer: MEDICARE

## 2025-04-11 VITALS
WEIGHT: 164.44 LBS | SYSTOLIC BLOOD PRESSURE: 124 MMHG | HEART RATE: 60 BPM | OXYGEN SATURATION: 99 % | DIASTOLIC BLOOD PRESSURE: 62 MMHG | BODY MASS INDEX: 22.27 KG/M2 | HEIGHT: 72 IN

## 2025-04-11 DIAGNOSIS — J43.9 PULMONARY EMPHYSEMA, UNSPECIFIED EMPHYSEMA TYPE: ICD-10-CM

## 2025-04-11 DIAGNOSIS — E78.5 HYPERLIPIDEMIA, UNSPECIFIED HYPERLIPIDEMIA TYPE: ICD-10-CM

## 2025-04-11 DIAGNOSIS — F01.50 VASCULAR DEMENTIA, UNCOMPLICATED: ICD-10-CM

## 2025-04-11 DIAGNOSIS — E11.3299 BACKGROUND DIABETIC RETINOPATHY: ICD-10-CM

## 2025-04-11 DIAGNOSIS — G40.909 SEIZURE DISORDER: ICD-10-CM

## 2025-04-11 DIAGNOSIS — E10.9 TYPE 1 DIABETES MELLITUS WITHOUT COMPLICATION: ICD-10-CM

## 2025-04-11 DIAGNOSIS — G25.5 OTHER CHOREA: ICD-10-CM

## 2025-04-11 DIAGNOSIS — I10 HYPERTENSION, UNSPECIFIED TYPE: Primary | ICD-10-CM

## 2025-04-11 DIAGNOSIS — E10.8 TYPE 1 DIABETES MELLITUS WITH COMPLICATION: ICD-10-CM

## 2025-04-11 DIAGNOSIS — F31.9 BIPOLAR AFFECTIVE DISORDER, REMISSION STATUS UNSPECIFIED: ICD-10-CM

## 2025-04-11 PROCEDURE — 99999 PR PBB SHADOW E&M-EST. PATIENT-LVL IV: CPT | Mod: PBBFAC,,, | Performed by: INTERNAL MEDICINE

## 2025-04-11 RX ORDER — INSULIN GLARGINE 100 [IU]/ML
30 INJECTION, SOLUTION SUBCUTANEOUS DAILY
Qty: 30 ML | Refills: 1 | Status: SHIPPED | OUTPATIENT
Start: 2025-04-11

## 2025-04-11 RX ORDER — OLANZAPINE 2.5 MG/1
2.5 TABLET ORAL 2 TIMES DAILY
Qty: 180 TABLET | Refills: 3 | Status: SHIPPED | OUTPATIENT
Start: 2025-04-11

## 2025-04-11 NOTE — PROGRESS NOTES
CHIEF COMPLAINT:  Follow up of multiple issues    HISTORY OF PRESENT ILLNESS: 74-year-old man presents for follow up of above     He has been doing well. He lives with his Costa Ledesma ([phone]), who helps him with his medications and meals. Most of the history is obtained from Costa due to his dementia. He is happy living with Costa. He is eating well.       HE is now taking ashwagandha (orgainic ashwagandha 380 mg plus ashwagandha extract 95 mg) one capsule once daily and Holy Basil Leaf (holy basil leaf extract 292 mg and holy basil leaf supercritical extract 190 mg) one capsule once daily.  HE is also taking Melatonin 10 mg 2  times daily which helps calm him. He is sleeping well. He has less movement of arms, legs, tongue.  he is sitting still and can hold a short conversation.     Mood is better on the supplements. HE is calmer.  Disposition is ok.  Memory is about 5 minutes Swallowing is better. No chocking.  He no longer worries about things.   HE has a sense of humor.       Appetite is good.      HE is taking olanzapine 2.5 mg twice daily, prozac 40 mg daily, depakote 250 mg in am and 500 mg in the evening and cogentin 0.5 mg twice daily for his mood and seizure disorder. Mood is good He has not had any seizures.  He is not paranoid.  He sleeps well.  No depression. He is possibly taking olanzapine 2.5 mg twice daily      He is currently taking Lantus 20-30 units in the evening and Novolog 5-10  units plus sliding scale before meals. Blood sugars have been better 120-150.   No fever, chills, nausea, voimiting, constipation, diarrhea. No significant hypoglycemia     No dysuria, hematuria, nocturia, straining or incomplete void, chest pain, shortness of breath, rhinorrhea, headache, blurred vision, hearing loss. He continues to take Crestor 10 mg daily for his hyperlipidemia. He denies any joint pain, muscle pain. He continues to take enalapril 20 mg twice daily and Norvasc 10 mg daily for his  hypertension. He is on omeprazole 20 mg once daily for reflux and has not had any reflux symptoms.              REVIEW OF SYSTEMS: She denies fevers, chills, night sweats, fatigue, visual change, hearing loss, sinus congestion, sore throat, chest pain, shortness of breath, nausea, vomiting, constipation, diarrhea, dysuria, hematuria, polydipsia, polyuria, joint pain, muscle pain, headaches.        PAST MEDICAL HISTORY:   1. Vascular dementia.   2. Type I diabetes.   3. Seizure disorder.   4. History of polysubstance abuse.   5. Recovered alcoholic   6. Bipolar disorder.   7. History of renal insufficiency.   8. Hepatitis C.   9. History of left a left upper extremity DVT January 2007 after an IV.   10. First cervical spine arthritis on C6-C7 on x-ray.   11. Hospitalization July 2008 due to ingestion of caustic substance, which caused erosive esophagitis.    12. History of her aspiration during hospitalization 7/2008.     MEDICATIONS and ALLERGIES: Updated on epic.     PHYSICAL EXAMINATION:    /62 (BP Location: Left arm, Patient Position: Sitting)   Pulse 60   Ht 6' (1.829 m)   Wt 74.6 kg (164 lb 7.4 oz)   SpO2 99%   BMI 22.31 kg/m²     General: Alert, oriented. No apparent distress. Affect within normal limits.   Conjunctivae anicteric. Tympanic membranes clear. Oropharynx clear. Poor dentition  Neck supple.   Respiratory effort normal. Lungs clear bilaterally. Good air flow  Heart: Regular rate and rhythm without murmurs, gallops or rubs.   No lower extremity edema.   Abdomen: Soft, nondistended, nontender. Bowel sounds present. No   hepatosplenomegaly.      Protective Sensation (w/ 10 gram monofilament):  Right: Decreased  Left: Decreased    Visual Inspection:  Small superficial wounds on both feet and lower legs. No erythema or warmth.     Pedal Pulses:   Right: Diminished  Left: Diminished    Posterior Tibialis Pulses:   Right:Diminished  Left: Diminished                 labs 4/2025 reviewed      ASSESSMENT AND PLAN:         1. Type I diabetes -  controlled  2. Hypertension - controlled.   3. Hyperlipidemia - controlled  4. Hepatitis C -saw hepatology 10/2018. Hepatitis C RNA negative.   5. Bipolar disorder with vascular dementia.  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 Seizure disorder - asx  Screening - . Pt declines colonscopy. Cologuard- care giver will not fill out.   10. Movement disorder - better with supplements  11. Cannot do colonoscopy prep  12. 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    Visit today included increased complexity associated with the care of the episodic problem  addressed and managing the longitudinal care of the patient due to the serious and/or complex managed problem(s) Type 1 diabetes, hypertension, hyperlipidemia, bipolar disorder with vascular dementia, copd, seizure disorder, movement disorder, wounds on lower legs, retinopathy.

## 2025-04-21 RX ORDER — DIVALPROEX SODIUM 500 MG/1
TABLET, FILM COATED, EXTENDED RELEASE ORAL
Qty: 90 TABLET | Refills: 4 | Status: SHIPPED | OUTPATIENT
Start: 2025-04-21

## 2025-04-21 RX ORDER — DIVALPROEX SODIUM 250 MG/1
250 TABLET, FILM COATED, EXTENDED RELEASE ORAL DAILY
Qty: 90 TABLET | Refills: 4 | Status: SHIPPED | OUTPATIENT
Start: 2025-04-21

## 2025-04-21 NOTE — TELEPHONE ENCOUNTER
No care due was identified.  [facility] Embedded Care Due Messages. Reference number: [id number].   4/21/2025 10:21:59 AM CDT